# Patient Record
Sex: FEMALE | Race: OTHER | NOT HISPANIC OR LATINO
[De-identification: names, ages, dates, MRNs, and addresses within clinical notes are randomized per-mention and may not be internally consistent; named-entity substitution may affect disease eponyms.]

---

## 2021-06-24 ENCOUNTER — ASOB RESULT (OUTPATIENT)
Age: 21
End: 2021-06-24

## 2021-06-24 ENCOUNTER — APPOINTMENT (OUTPATIENT)
Dept: ANTEPARTUM | Facility: CLINIC | Age: 21
End: 2021-06-24
Payer: MEDICAID

## 2021-06-24 PROCEDURE — 76817 TRANSVAGINAL US OBSTETRIC: CPT

## 2021-06-24 PROCEDURE — 76805 OB US >/= 14 WKS SNGL FETUS: CPT

## 2021-06-24 PROCEDURE — 99072 ADDL SUPL MATRL&STAF TM PHE: CPT

## 2021-08-04 PROBLEM — Z00.00 ENCOUNTER FOR PREVENTIVE HEALTH EXAMINATION: Status: ACTIVE | Noted: 2021-08-04

## 2021-09-23 ENCOUNTER — APPOINTMENT (OUTPATIENT)
Dept: ANTEPARTUM | Facility: CLINIC | Age: 21
End: 2021-09-23
Payer: MEDICAID

## 2021-09-23 ENCOUNTER — APPOINTMENT (OUTPATIENT)
Dept: ANTEPARTUM | Facility: HOSPITAL | Age: 21
End: 2021-09-23

## 2021-09-23 ENCOUNTER — ASOB RESULT (OUTPATIENT)
Age: 21
End: 2021-09-23

## 2021-09-23 PROCEDURE — 76816 OB US FOLLOW-UP PER FETUS: CPT

## 2021-09-23 PROCEDURE — 76819 FETAL BIOPHYS PROFIL W/O NST: CPT

## 2021-09-23 PROCEDURE — 99213 OFFICE O/P EST LOW 20 MIN: CPT | Mod: 25

## 2021-10-09 ENCOUNTER — OUTPATIENT (OUTPATIENT)
Dept: INPATIENT UNIT | Facility: HOSPITAL | Age: 21
LOS: 1 days | Discharge: HOME | End: 2021-10-09
Payer: MEDICAID

## 2021-10-09 VITALS — DIASTOLIC BLOOD PRESSURE: 73 MMHG | SYSTOLIC BLOOD PRESSURE: 122 MMHG | HEART RATE: 77 BPM

## 2021-10-09 VITALS — SYSTOLIC BLOOD PRESSURE: 122 MMHG | HEART RATE: 83 BPM | DIASTOLIC BLOOD PRESSURE: 84 MMHG

## 2021-10-09 DIAGNOSIS — O26.899 OTHER SPECIFIED PREGNANCY RELATED CONDITIONS, UNSPECIFIED TRIMESTER: ICD-10-CM

## 2021-10-09 DIAGNOSIS — Z3A.00 WEEKS OF GESTATION OF PREGNANCY NOT SPECIFIED: ICD-10-CM

## 2021-10-09 PROCEDURE — 76818 FETAL BIOPHYS PROFILE W/NST: CPT | Mod: 26

## 2021-10-09 PROCEDURE — 99214 OFFICE O/P EST MOD 30 MIN: CPT

## 2021-10-09 RX ORDER — BENZOYL PEROXIDE MICRONIZED 5.8 %
1 TOWELETTE (EA) TOPICAL
Qty: 0 | Refills: 0 | DISCHARGE

## 2021-10-09 NOTE — OB PROVIDER TRIAGE NOTE - NSOBPROVIDERNOTE_OBGYN_ALL_OB_FT
19 yo , EGA@38.5 weeks, presented to D&T with c/o decreased fetal movements since this morning.  Patient denies contractions, denies vaginal bleeding, leakage of fluid, and reports fetal movement.  Denies fever, chills, headaches, changes in vision, chest pain, palpitations, shortness of breath, cough, nausea, vomiting, diarrhea, constipation, urinary symptoms, edema.    Prenatal care with Dr Christine.    Prenatal course is uncomplicated.  patient reports an episode of palpitations and SOB last week, reported to Dr Christine yesterday, was referred to cardiologist.    GBS status is negative.  No adverse reactions to anesthesia, no objections to blood transfusions if clinically indicated.  OB hx: primigravida  Med hx: anemia  Surg hx: denies  GYN hx: denies hx of abnormal Papsmear/cysts/fibroids/STDs  Meds: Prenatal vitamins  Allergies: NKDA  shellfish (Vomiting)    Social hx: Denies alcohol, tobacco, drug use    PHYSICAL EXAM  Vital Signs:  T(C): 37.1 (09 Oct 2021 17:22), Max: 37.1 (09 Oct 2021 17:22)  HR: 83 (09 Oct 2021 17:22)   BP: 122/84 (09 Oct 2021 17:22)   RR: 14 (09 Oct 2021 17:22)     Gen: NAD  Head: NC/AT  Cardio: S1S2+, RRR  Resp: CTABL, no wheezing  Abdomen: Soft, NT/ND, BS+  Extremities: No LE edema bilaterally    FHR: 135 HR baseline, moderate variability, accelerations present, no decelerations, Category 1.  [place]: Contractions occasional mild    A:  19 yo , EGA@38.6  weeks, decreased fetal movements, category 1 FHTs    Plan: will consult with service attending after BPP.    Princess Carbone CNM     10-09-21 @ 17:32 21 yo , EGA@38.5 weeks, presented to D&T with c/o decreased fetal movements since this morning.  Patient denies contractions, denies vaginal bleeding, leakage of fluid, and reports fetal movement.  Denies fever, chills, headaches, changes in vision, chest pain, palpitations, shortness of breath, cough, nausea, vomiting, diarrhea, constipation, urinary symptoms, edema.    Prenatal care with Dr Christine.    Prenatal course is uncomplicated.  patient reports an episode of palpitations and SOB last week, reported to Dr Christine yesterday, was referred to cardiologist.    GBS status is negative.  No adverse reactions to anesthesia, no objections to blood transfusions if clinically indicated.  OB hx: primigravida  Med hx: anemia  Surg hx: denies  GYN hx: denies hx of abnormal Papsmear/cysts/fibroids/STDs  Meds: Prenatal vitamins  Allergies: NKDA  shellfish (Vomiting)    Social hx: Denies alcohol, tobacco, drug use    PHYSICAL EXAM  Vital Signs:  T(C): 37.1 (09 Oct 2021 17:22), Max: 37.1 (09 Oct 2021 17:22)  HR: 83 (09 Oct 2021 17:22)   BP: 122/84 (09 Oct 2021 17:22)   RR: 14 (09 Oct 2021 17:22)     Gen: NAD  Head: NC/AT  Cardio: S1S2+, RRR  Resp: CTABL, no wheezing  Abdomen: Soft, NT/ND, BS+  Extremities: No LE edema bilaterally    FHR: 135 HR baseline, moderate variability, accelerations present, no decelerations, Category 1.  [place]: Contractions occasional mild    A:  21 yo , EGA@38.6  weeks, decreased fetal movements, category 1 FHTs    Plan: will consult with service attending after BPP.    Princess Carbone CNM     10-09-21 @ 17:32    addendum at 17:55: BPP /, posterior placenta, ANN MARIE 10.3, EFW 6lbs; case was reviewed with Dr Meek, was advised to discharge patient home with fetal movement count instructions; labor precautions given; follow up with PMD as scheduled.

## 2021-10-09 NOTE — OB PROVIDER TRIAGE NOTE - HISTORY OF PRESENT ILLNESS
Patient presented c/o decreased fetal movements since this morning, reports + movements as soon as placed on EFM.

## 2021-10-14 ENCOUNTER — OUTPATIENT (OUTPATIENT)
Dept: INPATIENT UNIT | Facility: HOSPITAL | Age: 21
LOS: 1 days | Discharge: HOME | End: 2021-10-14
Payer: MEDICAID

## 2021-10-14 VITALS — SYSTOLIC BLOOD PRESSURE: 123 MMHG | HEART RATE: 89 BPM | DIASTOLIC BLOOD PRESSURE: 76 MMHG

## 2021-10-14 VITALS — DIASTOLIC BLOOD PRESSURE: 76 MMHG | SYSTOLIC BLOOD PRESSURE: 121 MMHG | HEART RATE: 81 BPM

## 2021-10-14 DIAGNOSIS — O26.899 OTHER SPECIFIED PREGNANCY RELATED CONDITIONS, UNSPECIFIED TRIMESTER: ICD-10-CM

## 2021-10-14 DIAGNOSIS — Z3A.00 WEEKS OF GESTATION OF PREGNANCY NOT SPECIFIED: ICD-10-CM

## 2021-10-14 PROBLEM — O99.019 ANEMIA COMPLICATING PREGNANCY, UNSPECIFIED TRIMESTER: Chronic | Status: ACTIVE | Noted: 2021-10-09

## 2021-10-14 PROCEDURE — 76816 OB US FOLLOW-UP PER FETUS: CPT | Mod: 26

## 2021-10-14 PROCEDURE — 99214 OFFICE O/P EST MOD 30 MIN: CPT | Mod: 25

## 2021-10-14 PROCEDURE — 76818 FETAL BIOPHYS PROFILE W/NST: CPT | Mod: 26

## 2021-10-14 NOTE — OB PROVIDER TRIAGE NOTE - NSHPPHYSICALEXAM_GEN_ALL_CORE
Emergency treatment for patients with severe immediate allergic reactions to drugs, food or insect bites:      The clinical appearance of severe immediate type allergic reactions can range from wheals and hives all over the body (urticaria), to swellings in the face (eyes, lips) to sometimes swellings in the tongue or airways with problems to swallow or breathe. These reactions can end up in cardiovascular reactions with collapse and shock.    1. Stay calm, avoid running around, and alert other people to help you    2. Immediately take your emergency medication.     For adults (over 16 years old): 2 tablet of antihistamines (e.g. cetirizine 10 mg or fexofenadine 180 mg) and 100 mg prednisone.     For children (less than 16 years old): 1 tablet of antihistamine (e.g. cetirizine 10 mg or fexofenadine 180 mg) and 50 mg prednisone    Swallow however you can, with or without water. This treatment is usually sufficient for treatment of uncomplicated hives and swellings.       Emergency set in key chain box containing 2 tablets prednisone 50 mg and 2 tablets cetirizine 10 mg.    3. In case of acute swelling of tongue, trouble swallowing, blocking of the airways, breathing problems, and/or signs of imminent collapse of shock (sweating, weakness, dizziness, paleness), use the adrenalin auto-injector (Epipen   for adults and Epipen   sarah for children). Make an injection into the outer thighs, if necessary through clothing.    4. Seek immediate emergency medical treatment after use.        Juliano Garcia, [facility], [place], [facility]; 03-           LS clear bilaterally  abd soft gravid NT no pain 0/10  TAS: vertex  BPP 8/8  ANN MARIE: 8.3  SSE: negative pooling negative nitrazine negative fern negative  SVE: 0/50/-3  FHT: moderate variability + accels negative decels  toco: irregular not feeling  Vital Signs Last 24 Hrs  T(C): 37.1 (14 Oct 2021 03:11), Max: 37.1 (14 Oct 2021 03:11)  T(F): 98.8 (14 Oct 2021 03:11), Max: 98.8 (14 Oct 2021 03:11)  HR: 89 (14 Oct 2021 03:11) (89 - 89)  BP: 123/76 (14 Oct 2021 03:11) (123/76 - 123/76)  BP(mean): --  RR: 16 (14 Oct 2021 03:11) (16 - 16)  SpO2: --

## 2021-10-14 NOTE — OB PROVIDER TRIAGE NOTE - HISTORY OF PRESENT ILLNESS
21 yo  @ 39.3 wks c/o LOF at 1120pm clear and feeling discharge @ 130am, denies vb, reports +GFM. AP course complicated by low maternal weight gain, poor fetal growth- MFM scan without fetal growth issue, transferred care to Dr Issa at 20 wks. denies fever chills ha n/v new swelling vision changes cp sob or cough. last seen in office Friday not dilated.     GBS: negative  meds: iron  All: shellfish  PMH: denies  PSH: denies  gyn hx; denies  ob hx: denies

## 2021-10-14 NOTE — OB PROVIDER TRIAGE NOTE - ADDITIONAL INSTRUCTIONS
d/w Dr Mcbride discharge home no evidence of rupture @ 39.3 wks  maternal and fetal surveillance reassuring  rest activity as tolerated  increase water intake  labor precautions instructed  keep all OB appointments- Friday  return for vaginal bleeding leakage of fluid decreased fetal movement or any concerns  v/w discharge instructions given with verbal understanding by patient

## 2021-10-14 NOTE — OB PROVIDER TRIAGE NOTE - NSOBPROVIDERNOTE_OBGYN_ALL_OB_FT
21 yo  @ 39.3 wks c/o LOF at 1120pm clear and feeling discharge @ 130am, denies vb, reports +GFM. AP course complicated by low maternal weight gain, poor fetal growth- MFM scan without fetal growth issue, transferred care to Dr Issa at 20 wks. denies fever chills ha n/v new swelling vision changes cp sob or cough. last seen in office Friday not dilated.     GBS: negative  meds: iron  All: shellfish  PMH: denies  PSH: denies  gyn hx; denies    d/w Dr Mcbride discharge home no evidence of rupture @ 39.3 wks  maternal and fetal surveillance reassuring  rest activity as tolerated  increase water intake  labor precautions instructed  keep all OB appointments- Friday  return for vaginal bleeding leakage of fluid decreased fetal movement or any concerns  v/w discharge instructions given with verbal understanding by patient

## 2021-10-19 ENCOUNTER — INPATIENT (INPATIENT)
Facility: HOSPITAL | Age: 21
LOS: 3 days | Discharge: HOME | End: 2021-10-23
Attending: OBSTETRICS & GYNECOLOGY | Admitting: OBSTETRICS & GYNECOLOGY

## 2021-10-19 VITALS — TEMPERATURE: 99 F

## 2021-10-19 DIAGNOSIS — O36.5931 MATERNAL CARE FOR OTHER KNOWN OR SUSPECTED POOR FETAL GROWTH, THIRD TRIMESTER, FETUS 1: ICD-10-CM

## 2021-10-19 DIAGNOSIS — Z3A.00 WEEKS OF GESTATION OF PREGNANCY NOT SPECIFIED: ICD-10-CM

## 2021-10-19 DIAGNOSIS — O26.899 OTHER SPECIFIED PREGNANCY RELATED CONDITIONS, UNSPECIFIED TRIMESTER: ICD-10-CM

## 2021-10-19 LAB
BASOPHILS # BLD AUTO: 0.02 K/UL — SIGNIFICANT CHANGE UP (ref 0–0.2)
BASOPHILS NFR BLD AUTO: 0.3 % — SIGNIFICANT CHANGE UP (ref 0–2)
BLD GP AB SCN SERPL QL: NEGATIVE — SIGNIFICANT CHANGE UP
EOSINOPHIL # BLD AUTO: 0.06 K/UL — SIGNIFICANT CHANGE UP (ref 0–0.5)
EOSINOPHIL NFR BLD AUTO: 0.9 % — SIGNIFICANT CHANGE UP (ref 0–6)
HCT VFR BLD CALC: 36.7 % — SIGNIFICANT CHANGE UP (ref 34.5–45)
HGB BLD-MCNC: 12.7 G/DL — SIGNIFICANT CHANGE UP (ref 11.5–15.5)
IANC: 3.47 K/UL — SIGNIFICANT CHANGE UP (ref 1.5–8.5)
IMM GRANULOCYTES NFR BLD AUTO: 0.1 % — SIGNIFICANT CHANGE UP (ref 0–1.5)
LYMPHOCYTES # BLD AUTO: 2.73 K/UL — SIGNIFICANT CHANGE UP (ref 1–3.3)
LYMPHOCYTES # BLD AUTO: 40.7 % — SIGNIFICANT CHANGE UP (ref 13–44)
MCHC RBC-ENTMCNC: 34.5 PG — HIGH (ref 27–34)
MCHC RBC-ENTMCNC: 34.6 GM/DL — SIGNIFICANT CHANGE UP (ref 32–36)
MCV RBC AUTO: 99.7 FL — SIGNIFICANT CHANGE UP (ref 80–100)
MONOCYTES # BLD AUTO: 0.42 K/UL — SIGNIFICANT CHANGE UP (ref 0–0.9)
MONOCYTES NFR BLD AUTO: 6.3 % — SIGNIFICANT CHANGE UP (ref 2–14)
NEUTROPHILS # BLD AUTO: 3.47 K/UL — SIGNIFICANT CHANGE UP (ref 1.8–7.4)
NEUTROPHILS NFR BLD AUTO: 51.7 % — SIGNIFICANT CHANGE UP (ref 43–77)
NRBC # BLD: 0 /100 WBCS — SIGNIFICANT CHANGE UP
NRBC # FLD: 0 K/UL — SIGNIFICANT CHANGE UP
PLATELET # BLD AUTO: 189 K/UL — SIGNIFICANT CHANGE UP (ref 150–400)
RBC # BLD: 3.68 M/UL — LOW (ref 3.8–5.2)
RBC # FLD: 13.5 % — SIGNIFICANT CHANGE UP (ref 10.3–14.5)
RH IG SCN BLD-IMP: POSITIVE — SIGNIFICANT CHANGE UP
RH IG SCN BLD-IMP: POSITIVE — SIGNIFICANT CHANGE UP
WBC # BLD: 6.71 K/UL — SIGNIFICANT CHANGE UP (ref 3.8–10.5)
WBC # FLD AUTO: 6.71 K/UL — SIGNIFICANT CHANGE UP (ref 3.8–10.5)

## 2021-10-19 RX ORDER — SODIUM CHLORIDE 9 MG/ML
1000 INJECTION, SOLUTION INTRAVENOUS
Refills: 0 | Status: DISCONTINUED | OUTPATIENT
Start: 2021-10-19 | End: 2021-10-21

## 2021-10-19 RX ORDER — OXYTOCIN 10 UNIT/ML
333.33 VIAL (ML) INJECTION
Qty: 20 | Refills: 0 | Status: DISCONTINUED | OUTPATIENT
Start: 2021-10-19 | End: 2021-10-21

## 2021-10-19 RX ADMIN — SODIUM CHLORIDE 125 MILLILITER(S): 9 INJECTION, SOLUTION INTRAVENOUS at 22:27

## 2021-10-19 NOTE — OB PROVIDER H&P - NSHPREVIEWOFSYSTEMS_GEN_ALL_CORE
REVIEW OF SYSTEMS:    CONSTITUTIONAL: No weakness, fevers or chills    EYES/ENT: No visual changes; No vertigo or throat pain     NECK: No pain or stiffness    RESPIRATORY: No cough, wheezing, hemoptysis; No shortness of breath    CARDIOVASCULAR: No chest pain or palpitations    GASTROINTESTINAL: No abdominal or epigastric pain. No nausea, vomiting, or hematemesis; No diarrhea or constipation. No melena or hematochezia.    GENITOURINARY: No dysuria, frequency or hematuria    NEUROLOGICAL: No numbness or weakness    SKIN: No itching, burning, rashes, or lesions     All other review of systems is negative unless indicated above

## 2021-10-19 NOTE — OB PROVIDER H&P - NSHPPHYSICALEXAM_GEN_ALL_CORE
T(C): 37.2 (10-19-21 @ 19:01), Max: 37.2 (10-19-21 @ 18:58)    HR: 75 (10-19-21 @ 20:18) (66 - 75)    BP: 126/86 (10-19-21 @ 20:18) (105/69 - 126/86)    RR: 16 (10-19-21 @ 19:01) (16 - 16)           Heart: RRR    Lungs: CTA    Abdomen: Gravid, soft, NT         NST: Reactive with moderate variability, Category 1 tracing    [place]: Irregular contractions not felt by patient    VE: closed/70/-3, intact membranes  TAS done today in office: EFW: 6#5

## 2021-10-19 NOTE — OB RN PATIENT PROFILE - EDUCATION ON THE POTENTIAL RISKS AND IMPACT OF EARLY USE OF PACIFIERS ON THE ESTABLISHMENT OF BREASTFEEDING

## 2021-10-19 NOTE — OB PROVIDER H&P - PROBLEM SELECTOR PLAN 1
-Admit to labor and delivery for PO Cytotec and Cevical Balloon  -Pain Management prn  -Cont EFM/[place]  -Admission labs: CBC, RPR, T&S  -IV hydration  -Clear liquid diet  -Cytotec PO for IOL

## 2021-10-19 NOTE — OB PROVIDER H&P - HISTORY OF PRESENT ILLNESS
20y  at 40w1d presents sent to triage by OB for admission due to findings of no interval growth in the past couple of weeks  Reports +FM, no vaginal bleeding, no ROM or LOF  Prenatal care: [facility]; Denies any complications so far  GBS: Negative

## 2021-10-19 NOTE — OB PROVIDER H&P - ASSESSMENT
20y  Female  at 40w1d for induction of labor due to poor fetal growth  D/w Dr Espinal  -Admit to labor and delivery for PO Cytotec and Cevical Balloon  -Pain Management prn  -Cont EFM/[place]  -Admission labs: CBC, RPR, T&S  -IV hydration  -Clear liquid diet  -Cytotec PO for IOL

## 2021-10-20 ENCOUNTER — TRANSCRIPTION ENCOUNTER (OUTPATIENT)
Age: 21
End: 2021-10-20

## 2021-10-20 LAB
ALBUMIN SERPL ELPH-MCNC: 3.1 G/DL — LOW (ref 3.3–5)
ALP SERPL-CCNC: 127 U/L — HIGH (ref 40–120)
ALT FLD-CCNC: 9 U/L — SIGNIFICANT CHANGE UP (ref 4–33)
ANION GAP SERPL CALC-SCNC: 12 MMOL/L — SIGNIFICANT CHANGE UP (ref 7–14)
APTT BLD: 28.3 SEC — SIGNIFICANT CHANGE UP (ref 27–36.3)
AST SERPL-CCNC: 20 U/L — SIGNIFICANT CHANGE UP (ref 4–32)
BASOPHILS # BLD AUTO: 0.02 K/UL — SIGNIFICANT CHANGE UP (ref 0–0.2)
BASOPHILS NFR BLD AUTO: 0.2 % — SIGNIFICANT CHANGE UP (ref 0–2)
BILIRUB SERPL-MCNC: 0.4 MG/DL — SIGNIFICANT CHANGE UP (ref 0.2–1.2)
BUN SERPL-MCNC: 8 MG/DL — SIGNIFICANT CHANGE UP (ref 7–23)
CALCIUM SERPL-MCNC: 8.3 MG/DL — LOW (ref 8.4–10.5)
CHLORIDE SERPL-SCNC: 104 MMOL/L — SIGNIFICANT CHANGE UP (ref 98–107)
CK SERPL-CCNC: 41 U/L — SIGNIFICANT CHANGE UP (ref 25–170)
CO2 SERPL-SCNC: 19 MMOL/L — LOW (ref 22–31)
COVID-19 SPIKE DOMAIN AB INTERP: NEGATIVE — SIGNIFICANT CHANGE UP
COVID-19 SPIKE DOMAIN ANTIBODY RESULT: 0.4 U/ML — SIGNIFICANT CHANGE UP
CREAT SERPL-MCNC: 0.72 MG/DL — SIGNIFICANT CHANGE UP (ref 0.5–1.3)
EOSINOPHIL # BLD AUTO: 0.02 K/UL — SIGNIFICANT CHANGE UP (ref 0–0.5)
EOSINOPHIL NFR BLD AUTO: 0.2 % — SIGNIFICANT CHANGE UP (ref 0–6)
FIBRINOGEN PPP-MCNC: 471 MG/DL — SIGNIFICANT CHANGE UP (ref 290–520)
GLUCOSE SERPL-MCNC: 69 MG/DL — LOW (ref 70–99)
HBV SURFACE AG SER-ACNC: SIGNIFICANT CHANGE UP
HCT VFR BLD CALC: 36.1 % — SIGNIFICANT CHANGE UP (ref 34.5–45)
HGB BLD-MCNC: 12.5 G/DL — SIGNIFICANT CHANGE UP (ref 11.5–15.5)
IANC: 8.3 K/UL — SIGNIFICANT CHANGE UP (ref 1.5–8.5)
IMM GRANULOCYTES NFR BLD AUTO: 0.4 % — SIGNIFICANT CHANGE UP (ref 0–1.5)
INR BLD: 1.01 RATIO — SIGNIFICANT CHANGE UP (ref 0.88–1.16)
LACTATE SERPL-SCNC: 0.9 MMOL/L — SIGNIFICANT CHANGE UP (ref 0.5–2)
LDH SERPL L TO P-CCNC: 176 U/L — SIGNIFICANT CHANGE UP (ref 135–225)
LYMPHOCYTES # BLD AUTO: 1.27 K/UL — SIGNIFICANT CHANGE UP (ref 1–3.3)
LYMPHOCYTES # BLD AUTO: 12.3 % — LOW (ref 13–44)
MCHC RBC-ENTMCNC: 34.6 GM/DL — SIGNIFICANT CHANGE UP (ref 32–36)
MCHC RBC-ENTMCNC: 34.7 PG — HIGH (ref 27–34)
MCV RBC AUTO: 100.3 FL — HIGH (ref 80–100)
MONOCYTES # BLD AUTO: 0.67 K/UL — SIGNIFICANT CHANGE UP (ref 0–0.9)
MONOCYTES NFR BLD AUTO: 6.5 % — SIGNIFICANT CHANGE UP (ref 2–14)
NEUTROPHILS # BLD AUTO: 8.3 K/UL — HIGH (ref 1.8–7.4)
NEUTROPHILS NFR BLD AUTO: 80.4 % — HIGH (ref 43–77)
NRBC # BLD: 0 /100 WBCS — SIGNIFICANT CHANGE UP
NRBC # FLD: 0 K/UL — SIGNIFICANT CHANGE UP
PLATELET # BLD AUTO: 135 K/UL — LOW (ref 150–400)
POTASSIUM SERPL-MCNC: 3.8 MMOL/L — SIGNIFICANT CHANGE UP (ref 3.5–5.3)
POTASSIUM SERPL-SCNC: 3.8 MMOL/L — SIGNIFICANT CHANGE UP (ref 3.5–5.3)
PROT SERPL-MCNC: 5.6 G/DL — LOW (ref 6–8.3)
PROTHROM AB SERPL-ACNC: 11.5 SEC — SIGNIFICANT CHANGE UP (ref 10.6–13.6)
RBC # BLD: 3.6 M/UL — LOW (ref 3.8–5.2)
RBC # FLD: 13.5 % — SIGNIFICANT CHANGE UP (ref 10.3–14.5)
SARS-COV-2 IGG+IGM SERPL QL IA: 0.4 U/ML — SIGNIFICANT CHANGE UP
SARS-COV-2 IGG+IGM SERPL QL IA: NEGATIVE — SIGNIFICANT CHANGE UP
SARS-COV-2 RNA SPEC QL NAA+PROBE: SIGNIFICANT CHANGE UP
SODIUM SERPL-SCNC: 135 MMOL/L — SIGNIFICANT CHANGE UP (ref 135–145)
T PALLIDUM AB TITR SER: NEGATIVE — SIGNIFICANT CHANGE UP
URATE SERPL-MCNC: 5.1 MG/DL — SIGNIFICANT CHANGE UP (ref 2.5–7)
WBC # BLD: 10.32 K/UL — SIGNIFICANT CHANGE UP (ref 3.8–10.5)
WBC # FLD AUTO: 10.32 K/UL — SIGNIFICANT CHANGE UP (ref 3.8–10.5)

## 2021-10-20 RX ORDER — DIBUCAINE 1 %
1 OINTMENT (GRAM) RECTAL EVERY 6 HOURS
Refills: 0 | Status: DISCONTINUED | OUTPATIENT
Start: 2021-10-20 | End: 2021-10-23

## 2021-10-20 RX ORDER — ACETAMINOPHEN 500 MG
1000 TABLET ORAL ONCE
Refills: 0 | Status: COMPLETED | OUTPATIENT
Start: 2021-10-20 | End: 2021-10-20

## 2021-10-20 RX ORDER — PRAMOXINE HYDROCHLORIDE 150 MG/15G
1 AEROSOL, FOAM RECTAL EVERY 4 HOURS
Refills: 0 | Status: DISCONTINUED | OUTPATIENT
Start: 2021-10-20 | End: 2021-10-23

## 2021-10-20 RX ORDER — OXYTOCIN 10 UNIT/ML
2 VIAL (ML) INJECTION
Qty: 30 | Refills: 0 | Status: DISCONTINUED | OUTPATIENT
Start: 2021-10-20 | End: 2021-10-21

## 2021-10-20 RX ORDER — TETANUS TOXOID, REDUCED DIPHTHERIA TOXOID AND ACELLULAR PERTUSSIS VACCINE, ADSORBED 5; 2.5; 8; 8; 2.5 [IU]/.5ML; [IU]/.5ML; UG/.5ML; UG/.5ML; UG/.5ML
0.5 SUSPENSION INTRAMUSCULAR ONCE
Refills: 0 | Status: DISCONTINUED | OUTPATIENT
Start: 2021-10-20 | End: 2021-10-23

## 2021-10-20 RX ORDER — SODIUM CHLORIDE 9 MG/ML
3 INJECTION INTRAMUSCULAR; INTRAVENOUS; SUBCUTANEOUS EVERY 8 HOURS
Refills: 0 | Status: DISCONTINUED | OUTPATIENT
Start: 2021-10-20 | End: 2021-10-23

## 2021-10-20 RX ORDER — DIPHENHYDRAMINE HCL 50 MG
25 CAPSULE ORAL EVERY 6 HOURS
Refills: 0 | Status: DISCONTINUED | OUTPATIENT
Start: 2021-10-20 | End: 2021-10-23

## 2021-10-20 RX ORDER — METOCLOPRAMIDE HCL 10 MG
10 TABLET ORAL ONCE
Refills: 0 | Status: DISCONTINUED | OUTPATIENT
Start: 2021-10-20 | End: 2021-10-23

## 2021-10-20 RX ORDER — SIMETHICONE 80 MG/1
80 TABLET, CHEWABLE ORAL EVERY 4 HOURS
Refills: 0 | Status: DISCONTINUED | OUTPATIENT
Start: 2021-10-20 | End: 2021-10-23

## 2021-10-20 RX ORDER — HYDROCORTISONE 1 %
1 OINTMENT (GRAM) TOPICAL EVERY 6 HOURS
Refills: 0 | Status: DISCONTINUED | OUTPATIENT
Start: 2021-10-20 | End: 2021-10-23

## 2021-10-20 RX ORDER — ONDANSETRON 8 MG/1
4 TABLET, FILM COATED ORAL ONCE
Refills: 0 | Status: COMPLETED | OUTPATIENT
Start: 2021-10-20 | End: 2021-10-20

## 2021-10-20 RX ORDER — SODIUM CHLORIDE 9 MG/ML
1000 INJECTION INTRAMUSCULAR; INTRAVENOUS; SUBCUTANEOUS
Refills: 0 | Status: DISCONTINUED | OUTPATIENT
Start: 2021-10-20 | End: 2021-10-21

## 2021-10-20 RX ORDER — BENZOCAINE 10 %
1 GEL (GRAM) MUCOUS MEMBRANE EVERY 6 HOURS
Refills: 0 | Status: DISCONTINUED | OUTPATIENT
Start: 2021-10-20 | End: 2021-10-23

## 2021-10-20 RX ORDER — OXYTOCIN 10 UNIT/ML
20 VIAL (ML) INJECTION ONCE
Refills: 0 | Status: DISCONTINUED | OUTPATIENT
Start: 2021-10-20 | End: 2021-10-20

## 2021-10-20 RX ORDER — IBUPROFEN 200 MG
600 TABLET ORAL EVERY 6 HOURS
Refills: 0 | Status: DISCONTINUED | OUTPATIENT
Start: 2021-10-20 | End: 2021-10-23

## 2021-10-20 RX ORDER — KETOROLAC TROMETHAMINE 30 MG/ML
30 SYRINGE (ML) INJECTION ONCE
Refills: 0 | Status: DISCONTINUED | OUTPATIENT
Start: 2021-10-20 | End: 2021-10-21

## 2021-10-20 RX ORDER — ACETAMINOPHEN 500 MG
975 TABLET ORAL
Refills: 0 | Status: DISCONTINUED | OUTPATIENT
Start: 2021-10-20 | End: 2021-10-23

## 2021-10-20 RX ORDER — AER TRAVELER 0.5 G/1
1 SOLUTION RECTAL; TOPICAL EVERY 4 HOURS
Refills: 0 | Status: DISCONTINUED | OUTPATIENT
Start: 2021-10-20 | End: 2021-10-23

## 2021-10-20 RX ORDER — METOCLOPRAMIDE HCL 10 MG
10 TABLET ORAL ONCE
Refills: 0 | Status: DISCONTINUED | OUTPATIENT
Start: 2021-10-20 | End: 2021-10-20

## 2021-10-20 RX ORDER — OXYTOCIN 10 UNIT/ML
333.33 VIAL (ML) INJECTION
Qty: 20 | Refills: 0 | Status: DISCONTINUED | OUTPATIENT
Start: 2021-10-20 | End: 2021-10-21

## 2021-10-20 RX ORDER — OXYCODONE HYDROCHLORIDE 5 MG/1
5 TABLET ORAL ONCE
Refills: 0 | Status: DISCONTINUED | OUTPATIENT
Start: 2021-10-20 | End: 2021-10-23

## 2021-10-20 RX ORDER — OXYCODONE HYDROCHLORIDE 5 MG/1
5 TABLET ORAL
Refills: 0 | Status: DISCONTINUED | OUTPATIENT
Start: 2021-10-20 | End: 2021-10-23

## 2021-10-20 RX ORDER — LANOLIN
1 OINTMENT (GRAM) TOPICAL EVERY 6 HOURS
Refills: 0 | Status: DISCONTINUED | OUTPATIENT
Start: 2021-10-20 | End: 2021-10-23

## 2021-10-20 RX ORDER — MAGNESIUM HYDROXIDE 400 MG/1
30 TABLET, CHEWABLE ORAL
Refills: 0 | Status: DISCONTINUED | OUTPATIENT
Start: 2021-10-20 | End: 2021-10-23

## 2021-10-20 RX ORDER — OXYTOCIN 10 UNIT/ML
20 VIAL (ML) INJECTION ONCE
Refills: 0 | Status: COMPLETED | OUTPATIENT
Start: 2021-10-20 | End: 2021-10-20

## 2021-10-20 RX ORDER — SODIUM CHLORIDE 9 MG/ML
300 INJECTION INTRAMUSCULAR; INTRAVENOUS; SUBCUTANEOUS ONCE
Refills: 0 | Status: COMPLETED | OUTPATIENT
Start: 2021-10-20 | End: 2021-10-20

## 2021-10-20 RX ADMIN — Medication 0.2 MILLIGRAM(S): at 20:14

## 2021-10-20 RX ADMIN — Medication 0.25 MILLIGRAM(S): at 12:10

## 2021-10-20 RX ADMIN — Medication 1000 MILLIGRAM(S): at 22:03

## 2021-10-20 RX ADMIN — Medication 1000 MILLIUNIT(S)/MIN: at 22:31

## 2021-10-20 RX ADMIN — Medication 2 MILLIUNIT(S)/MIN: at 16:46

## 2021-10-20 RX ADMIN — Medication 400 MILLIGRAM(S): at 21:33

## 2021-10-20 RX ADMIN — Medication 20 UNIT(S): at 20:14

## 2021-10-20 RX ADMIN — SODIUM CHLORIDE 1200 MILLILITER(S): 9 INJECTION INTRAMUSCULAR; INTRAVENOUS; SUBCUTANEOUS at 12:15

## 2021-10-20 RX ADMIN — ONDANSETRON 4 MILLIGRAM(S): 8 TABLET, FILM COATED ORAL at 09:11

## 2021-10-20 RX ADMIN — SODIUM CHLORIDE 125 MILLILITER(S): 9 INJECTION INTRAMUSCULAR; INTRAVENOUS; SUBCUTANEOUS at 16:11

## 2021-10-20 RX ADMIN — ONDANSETRON 4 MILLIGRAM(S): 8 TABLET, FILM COATED ORAL at 21:04

## 2021-10-20 NOTE — OB RN DELIVERY SUMMARY - NSSELHIDDEN_OBGYN_ALL_OB_FT
[NS_DeliveryAttending1_OBGYN_ALL_OB_FT:[id number]],[NS_DeliveryRN_OBGYN_ALL_OB_FT:[id number]] [NS_DeliveryAttending1_OBGYN_ALL_OB_FT:[id number]],[NS_DeliveryRN_OBGYN_ALL_OB_FT:[id number]],[NS_DeliveryAssist1_OBGYN_ALL_OB_FT:[id number]=]

## 2021-10-20 NOTE — OB RN DELIVERY SUMMARY - NS_SEPSISRSKCALC_OBGYN_ALL_OB_FT
EOS calculated successfully. EOS Risk Factor: 0.5/1000 live births ([facility] national incidence); GA=40w2d; Temp=99; ROM=8.25; GBS='Negative'; Antibiotics='No antibiotics or any antibiotics < 2 hrs prior to birth'

## 2021-10-20 NOTE — DISCHARGE NOTE OB - PLAN OF CARE
After discharge, please stay on pelvic rest for 6 weeks, meaning no sexual intercourse, no tampons and no douching.   No lifting objects heavier than baby for two weeks.  Expect to have vaginal bleeding/spotting for up to six weeks.  The bleeding should get lighter and more white/light brown with time.  For bleeding soaking more than a pad an hour or passing clots greater than the size of your fist, come in to the emergency department.    Follow up in office in 6 weeks.

## 2021-10-20 NOTE — OB PROVIDER LABOR PROGRESS NOTE - NS_OBIHIFHRDETAILS_OBGYN_ALL_OB_FT
baseline 120,moderate variability, +accels, -decels
repetitive variable decelerations noted  SROM was noted for clear amniotic fluid

## 2021-10-20 NOTE — DISCHARGE NOTE OB - HOSPITAL COURSE
Patient had nonsurgical vaginal delivery complicated by postpartum hemorrhage 2/2 uterine atony that improved status post methergine, pitocin, and bimanual massage. Please see delivery note for details.      During postpartum course patient's vitals were stable, vaginal bleeding appropriate, and pain well controlled.      On day of discharge patient is ambulating, tolerating oral intake, voiding spontaneously and her pain is controlled with oral medications. Discharge instructions and precautions were given.  Will return to office in 6 weeks for postpartum visit and may contact the office with any concerns or issues prior to that time.

## 2021-10-20 NOTE — PROVIDER CONTACT NOTE (OTHER) - SITUATION
s/p  of viable female @ presents with elevated BP and HA postpartum s/p  of viable female @. patient presents with elevated BP and HA postpartum

## 2021-10-20 NOTE — OB PROVIDER LABOR PROGRESS NOTE - ASSESSMENT
Dr Arrington called; IUPC was placed for possible amnioinfusion; as per PMD, Terbutaline was administered patient is to transferred to OR for monitoring and possible  section.

## 2021-10-20 NOTE — DISCHARGE NOTE OB - PATIENT PORTAL LINK FT
You can access the FollowMyHealth Patient Portal offered by [facility] by registering at the following website: [email or web address]. By joining [facility]’s FollowMyHealth portal, you will also be able to view your health information using other applications (apps) compatible with our system.

## 2021-10-20 NOTE — DISCHARGE NOTE OB - CARE PLAN
Principal Discharge DX:	 (normal spontaneous vaginal delivery)  Assessment and plan of treatment:	After discharge, please stay on pelvic rest for 6 weeks, meaning no sexual intercourse, no tampons and no douching.   No lifting objects heavier than baby for two weeks.  Expect to have vaginal bleeding/spotting for up to six weeks.  The bleeding should get lighter and more white/light brown with time.  For bleeding soaking more than a pad an hour or passing clots greater than the size of your fist, come in to the emergency department.    Follow up in office in 6 weeks.   1

## 2021-10-20 NOTE — PROVIDER CONTACT NOTE (OTHER) - DATE AND TIME:
20-Oct-2021 21:30 Finasteride Male Counseling: Finasteride Counseling:  I discussed with the patient the risks of use of finasteride including but not limited to decreased libido, decreased ejaculate volume, gynecomastia, and depression. Women should not handle medication.  All of the patient's questions and concerns were addressed. Finasteride Counseling:  I discussed with the patient the risks of use of finasteride including but not limited to decreased libido, decreased ejaculate volume, gynecomastia, and depression. Women should not handle medication.  All of the patient's questions and concerns were addressed.

## 2021-10-20 NOTE — DISCHARGE NOTE OB - CARE PROVIDER_API CALL
Alessandra Mcbride (DO)  Obstetrics and Gynecology  [address] [place] [address]  Phone: [phone]  Fax: [phone]  Follow Up Time:

## 2021-10-20 NOTE — OB PROVIDER LABOR PROGRESS NOTE - ASSESSMENT
PLAN:  Continue Pitocin as FHT tolerates.     D/w Dr. Murphy Narvaez PGY-4 PLAN:  Continue Pitocin as FHT tolerates.   FHT currently reassuring with moderate variability.     D/w Dr. Murphy Narvaez PGY-4

## 2021-10-20 NOTE — OB PROVIDER DELIVERY SUMMARY - NSSELHIDDEN_OBGYN_ALL_OB_FT
[NS_DeliveryAttending1_OBGYN_ALL_OB_FT:[id number]] [NS_DeliveryAttending1_OBGYN_ALL_OB_FT:[id number]],[NS_DeliveryAssist1_OBGYN_ALL_OB_FT:[id number]=]

## 2021-10-20 NOTE — OB PROVIDER LABOR PROGRESS NOTE - ASSESSMENT
21 y/o  @40w2d IOL for poor interval growth.  - IOL: on po cytotec, CB placed 60 cc NS in uterine, 60 in vagina  - comfortable with epidural  - cat I tracing    Netta Beltran, PGY-2

## 2021-10-20 NOTE — OB PROVIDER DELIVERY SUMMARY - NSPROVIDERDELIVERYNOTE_OBGYN_ALL_OB_FT
Spontaneous vaginal delivery of liveborn infant in CHRIS position. Head delivered easily through loose nuchal cord x1. Shoulders and body delivered easily after. Infant was suctioned. No mec was noted. Infant was passed to mother for delayed cord clamping and skin to skin. cord was clamped and cut in delayed fashion per peds present at delivery. Placenta delivered intact with a 3 vessel cord noted. Uterine atony was noted which improved status post methergine, fundal massage and post partum Pitocin. Uterine fundus was firm after uterotonics and massage. Vaginal exam was performed and noted intact cervix, vaginal walls and sulci. Small 1st degree laceration was noted and hemostatic.  Excellent hemostasis was noted. Count was correctx2. Pt. was stable after delivery.     Female, Apgars 8/9, weight 3090g. Spontaneous vaginal delivery of liveborn infant in CHRIS position. Head delivered easily through loose nuchal cord x1. Shoulders and body delivered easily after. Infant was suctioned. No mec was noted. Infant was passed to mother for delayed cord clamping and skin to skin. cord was clamped and cut in delayed fashion per peds present at delivery. Placenta delivered intact with a 3 vessel cord noted. Uterine atony was noted which improved status post methergine, fundal massage and post partum Pitocin 40U. Uterine fundus was firm after uterotonics and massage. Vaginal exam was performed and noted intact cervix, vaginal walls and sulci. Small 1st degree laceration was noted and hemostatic.  Excellent hemostasis was noted. Count was correctx2. Pt was stable after delivery.     Female, Apgars 8/9, Weight 3090g.    - Dr Beetham - Amyeo Afroz Jereen, PGY-1

## 2021-10-20 NOTE — DISCHARGE NOTE OB - MEDICATION SUMMARY - MEDICATIONS TO TAKE
I will START or STAY ON the medications listed below when I get home from the hospital:    acetaminophen 325 mg oral tablet  -- 3 tab(s) by mouth every 6 hours, As Needed  -- Indication: For  (normal spontaneous vaginal delivery)    ibuprofen 600 mg oral tablet  -- 1 tab(s) by mouth every 6 hours, As Needed  -- Indication: For  (normal spontaneous vaginal delivery)    Prenatal Multivitamins with Folic Acid 1 mg oral tablet  -- 1 tab(s) by mouth once a day  -- Indication: For  (normal spontaneous vaginal delivery)    Iron 100 Plus oral tablet  -- 1 tab(s) by mouth once a day  -- Indication: For  (normal spontaneous vaginal delivery)

## 2021-10-20 NOTE — OB PROVIDER LABOR PROGRESS NOTE - NS_SUBJECTIVE/OBJECTIVE_OBGYN_ALL_OB_FT
Patient seen and examined for CB placement    Vital Signs Last 24 Hrs  T(C): 36.6 (20 Oct 2021 06:05), Max: 37.2 (19 Oct 2021 18:58)  T(F): 97.88 (20 Oct 2021 06:05), Max: 99 (19 Oct 2021 19:01)  HR: 63 (20 Oct 2021 06:52) (63 - 97)  BP: 99/60 (20 Oct 2021 06:50) (93/59 - 132/89)  BP(mean): --  RR: 18 (19 Oct 2021 20:53) (16 - 18)  SpO2: 98% (20 Oct 2021 06:52) (98% - 100%)
Patient was evaluated at bedside at 11:50 am due to variable decelerations  Cervical balloon is place;
R4 Chart Note:    Patient examined by the attending.

## 2021-10-21 LAB
ALBUMIN SERPL ELPH-MCNC: 2.8 G/DL — LOW (ref 3.3–5)
ALP SERPL-CCNC: 70 U/L — SIGNIFICANT CHANGE UP (ref 40–120)
ALT FLD-CCNC: 7 U/L — SIGNIFICANT CHANGE UP (ref 4–33)
ANION GAP SERPL CALC-SCNC: 10 MMOL/L — SIGNIFICANT CHANGE UP (ref 7–14)
APTT BLD: 31.3 SEC — SIGNIFICANT CHANGE UP (ref 27–36.3)
AST SERPL-CCNC: 26 U/L — SIGNIFICANT CHANGE UP (ref 4–32)
BASOPHILS # BLD AUTO: 0.02 K/UL — SIGNIFICANT CHANGE UP (ref 0–0.2)
BASOPHILS NFR BLD AUTO: 0.1 % — SIGNIFICANT CHANGE UP (ref 0–2)
BILIRUB SERPL-MCNC: 0.4 MG/DL — SIGNIFICANT CHANGE UP (ref 0.2–1.2)
BUN SERPL-MCNC: 7 MG/DL — SIGNIFICANT CHANGE UP (ref 7–23)
CALCIUM SERPL-MCNC: 8 MG/DL — LOW (ref 8.4–10.5)
CHLORIDE SERPL-SCNC: 108 MMOL/L — HIGH (ref 98–107)
CK SERPL-CCNC: 255 U/L — HIGH (ref 25–170)
CO2 SERPL-SCNC: 20 MMOL/L — LOW (ref 22–31)
CREAT SERPL-MCNC: 0.76 MG/DL — SIGNIFICANT CHANGE UP (ref 0.5–1.3)
EOSINOPHIL # BLD AUTO: 0.03 K/UL — SIGNIFICANT CHANGE UP (ref 0–0.5)
EOSINOPHIL NFR BLD AUTO: 0.2 % — SIGNIFICANT CHANGE UP (ref 0–6)
FIBRINOGEN PPP-MCNC: 487 MG/DL — SIGNIFICANT CHANGE UP (ref 290–520)
GLUCOSE SERPL-MCNC: 75 MG/DL — SIGNIFICANT CHANGE UP (ref 70–99)
HCT VFR BLD CALC: 33 % — LOW (ref 34.5–45)
HGB BLD-MCNC: 11.6 G/DL — SIGNIFICANT CHANGE UP (ref 11.5–15.5)
IANC: 14.43 K/UL — HIGH (ref 1.5–8.5)
IMM GRANULOCYTES NFR BLD AUTO: 0.6 % — SIGNIFICANT CHANGE UP (ref 0–1.5)
INR BLD: 0.9 RATIO — SIGNIFICANT CHANGE UP (ref 0.88–1.16)
LACTATE SERPL-SCNC: 1.8 MMOL/L — SIGNIFICANT CHANGE UP (ref 0.5–2)
LDH SERPL L TO P-CCNC: 217 U/L — SIGNIFICANT CHANGE UP (ref 135–225)
LYMPHOCYTES # BLD AUTO: 12.4 % — LOW (ref 13–44)
LYMPHOCYTES # BLD AUTO: 2.19 K/UL — SIGNIFICANT CHANGE UP (ref 1–3.3)
MAGNESIUM SERPL-MCNC: 6.7 MG/DL — HIGH (ref 1.6–2.6)
MAGNESIUM SERPL-MCNC: 6.7 MG/DL — HIGH (ref 1.6–2.6)
MCHC RBC-ENTMCNC: 35.2 GM/DL — SIGNIFICANT CHANGE UP (ref 32–36)
MCHC RBC-ENTMCNC: 35.3 PG — HIGH (ref 27–34)
MCV RBC AUTO: 100.3 FL — HIGH (ref 80–100)
MONOCYTES # BLD AUTO: 0.84 K/UL — SIGNIFICANT CHANGE UP (ref 0–0.9)
MONOCYTES NFR BLD AUTO: 4.8 % — SIGNIFICANT CHANGE UP (ref 2–14)
NEUTROPHILS # BLD AUTO: 14.43 K/UL — HIGH (ref 1.8–7.4)
NEUTROPHILS NFR BLD AUTO: 81.9 % — HIGH (ref 43–77)
NRBC # BLD: 0 /100 WBCS — SIGNIFICANT CHANGE UP
NRBC # FLD: 0 K/UL — SIGNIFICANT CHANGE UP
PLATELET # BLD AUTO: 142 K/UL — LOW (ref 150–400)
POTASSIUM SERPL-MCNC: 4.1 MMOL/L — SIGNIFICANT CHANGE UP (ref 3.5–5.3)
POTASSIUM SERPL-SCNC: 4.1 MMOL/L — SIGNIFICANT CHANGE UP (ref 3.5–5.3)
PROT SERPL-MCNC: 4.9 G/DL — LOW (ref 6–8.3)
PROTHROM AB SERPL-ACNC: 10.4 SEC — LOW (ref 10.6–13.6)
RBC # BLD: 3.29 M/UL — LOW (ref 3.8–5.2)
RBC # FLD: 13.7 % — SIGNIFICANT CHANGE UP (ref 10.3–14.5)
SODIUM SERPL-SCNC: 138 MMOL/L — SIGNIFICANT CHANGE UP (ref 135–145)
URATE SERPL-MCNC: 5.9 MG/DL — SIGNIFICANT CHANGE UP (ref 2.5–7)
WBC # BLD: 17.62 K/UL — HIGH (ref 3.8–10.5)
WBC # FLD AUTO: 17.62 K/UL — HIGH (ref 3.8–10.5)

## 2021-10-21 RX ORDER — SODIUM CHLORIDE 9 MG/ML
1000 INJECTION, SOLUTION INTRAVENOUS
Refills: 0 | Status: DISCONTINUED | OUTPATIENT
Start: 2021-10-21 | End: 2021-10-23

## 2021-10-21 RX ORDER — MAGNESIUM SULFATE 500 MG/ML
2 VIAL (ML) INJECTION
Qty: 40 | Refills: 0 | Status: DISCONTINUED | OUTPATIENT
Start: 2021-10-21 | End: 2021-10-21

## 2021-10-21 RX ORDER — MAGNESIUM SULFATE 500 MG/ML
4 VIAL (ML) INJECTION ONCE
Refills: 0 | Status: DISCONTINUED | OUTPATIENT
Start: 2021-10-21 | End: 2021-10-21

## 2021-10-21 RX ORDER — MAGNESIUM SULFATE 500 MG/ML
2 VIAL (ML) INJECTION
Qty: 40 | Refills: 0 | Status: DISCONTINUED | OUTPATIENT
Start: 2021-10-21 | End: 2021-10-23

## 2021-10-21 RX ORDER — IBUPROFEN 200 MG
1 TABLET ORAL
Qty: 0 | Refills: 0 | DISCHARGE
Start: 2021-10-21

## 2021-10-21 RX ORDER — ACETAMINOPHEN 500 MG
3 TABLET ORAL
Qty: 0 | Refills: 0 | DISCHARGE
Start: 2021-10-21

## 2021-10-21 RX ORDER — MAGNESIUM SULFATE 500 MG/ML
4 VIAL (ML) INJECTION ONCE
Refills: 0 | Status: COMPLETED | OUTPATIENT
Start: 2021-10-21 | End: 2021-10-21

## 2021-10-21 RX ADMIN — SODIUM CHLORIDE 50 MILLILITER(S): 9 INJECTION, SOLUTION INTRAVENOUS at 07:23

## 2021-10-21 RX ADMIN — SIMETHICONE 80 MILLIGRAM(S): 80 TABLET, CHEWABLE ORAL at 00:45

## 2021-10-21 RX ADMIN — Medication 30 MILLIGRAM(S): at 01:29

## 2021-10-21 RX ADMIN — Medication 300 GRAM(S): at 05:41

## 2021-10-21 RX ADMIN — Medication 50 GM/HR: at 06:04

## 2021-10-21 RX ADMIN — Medication 975 MILLIGRAM(S): at 23:00

## 2021-10-21 RX ADMIN — Medication 975 MILLIGRAM(S): at 14:06

## 2021-10-21 RX ADMIN — Medication 975 MILLIGRAM(S): at 04:54

## 2021-10-21 RX ADMIN — SODIUM CHLORIDE 3 MILLILITER(S): 9 INJECTION INTRAMUSCULAR; INTRAVENOUS; SUBCUTANEOUS at 06:12

## 2021-10-21 RX ADMIN — Medication 975 MILLIGRAM(S): at 05:15

## 2021-10-21 RX ADMIN — SIMETHICONE 80 MILLIGRAM(S): 80 TABLET, CHEWABLE ORAL at 04:54

## 2021-10-21 RX ADMIN — Medication 975 MILLIGRAM(S): at 22:45

## 2021-10-21 RX ADMIN — Medication 50 GM/HR: at 07:23

## 2021-10-21 RX ADMIN — Medication 50 GM/HR: at 07:54

## 2021-10-21 RX ADMIN — Medication 30 MILLIGRAM(S): at 01:59

## 2021-10-21 RX ADMIN — Medication 50 GM/HR: at 19:29

## 2021-10-21 NOTE — LACTATION INITIAL EVALUATION - INTERVENTION OUTCOME
Instructed in hand expression with good return demonstration. + colostrum noted./verbalizes understanding/demonstrates understanding of teaching/good return demonstration/needs met/Lactation team to follow up

## 2021-10-21 NOTE — LACTATION INITIAL EVALUATION - LACTATION INTERVENTIONS
initiate/review safe skin-to-skin/initiate/review hand expression/initiate/review pumping guidelines and safe milk handling/initiate/review breast massage/compression

## 2021-10-21 NOTE — LACTATION INITIAL EVALUATION - BREAST ASSESSMENT (RIGHT)
Date & Time: 9/23/2019, 11:07 AM  Patient: Marv Moser  Encounter Provider(s):    Roslyn Cee MD       To Whom It May Concern:    Jaydon Harris was seen and treated in our department on 9/23/2019. He is excused from work for 2-3 days.     If you hav small

## 2021-10-21 NOTE — OB NEONATOLOGY/PEDIATRICIAN DELIVERY SUMMARY - NSPEDSNEONOTESA_OBGYN_ALL_OB_FT
40.2 wk female born via  to a 19 y/o  mother. Maternal history of anemia on iron supplementation. Prenatal history of IOL for poor fetal growth. Blood type O+, HIV[-], RPR [NR], rubella [I] , Hep B[-], GBS [unk] not tx. SROM at 11:42 with clear fluids. Baby emerged vigorous, crying, was w/d/s/s. APGARS 8/9 . Mom plans to initiate breastfeeding with formula supplementation, consents to Hep B vaccine. EOS 0.14. COVID negative 10/19. BW 3090 AGA.

## 2021-10-21 NOTE — OB NEONATOLOGY/PEDIATRICIAN DELIVERY SUMMARY - BABY A: VOID IN DELIVERY
"Occupational Therapy   Treatment    Name: Riaz Lane  MRN: [id number]  Admitting Diagnosis:  Embolic stroke involving left middle cerebral artery  11 Days Post-Op    Recommendations:     Discharge Recommendations: outpatient OT  Discharge Equipment Recommendations:  none  Barriers to discharge:  Inaccessible home environment(patient is homeless)    Assessment:     Riaz Lane is a 60 y.o. male with a medical diagnosis of Embolic stroke involving left middle cerebral artery.  He presents with performance deficits affecting function are weakness, impaired endurance, impaired functional mobilty, impaired cognition, impaired cardiopulmonary response to activity.     Rehab Prognosis:  Good; patient would benefit from acute skilled OT services to address these deficits and reach maximum level of function.       Plan:     Patient to be seen 3 x/week to address the above listed problems via self-care/home management, therapeutic activities, therapeutic exercises.   · Plan of Care Expires: 01/06/20  · Plan of Care Reviewed with: patient    Subjective     Patient: "gosh you guys have been the best"  "I feel like my talking is so much better"  "I just decided when its time to go, I can feel it and then I know I have to, that's why I left [place]"   "I forget easily"     Pain/Comfort:  · Pain Rating 1: 0/10  · Pain Rating Post-Intervention 1: 0/10    Objective:     Communicated with: RN prior to session.  Patient found HOB elevated with telemetry, peripheral IV upon OT entry to room. No family at bedside. Patient's room full of papers, pictures, memorabilia, per patient his friend had dropped them off.    General Precautions: Standard, aphasia   Orthopedic Precautions:N/A   Braces: N/A     Occupational Performance:     Instrumental Activities of Daily Living:  · Household management: patient given blank calender, with OT assistance, filled out important dates and names during patient[place] [facility] Patient "I can't believe I've " "been here so long"   · Medical management: education on use of written lists for consolidation of important names and phone numbers 2* patient's impaired STM and expressive aphasia. Patient succesfully created list with Mod VC for initiation and directions.   · Planning: patient encouraged to ask for names/phone numbers of medical team to maximize carry-through and access to continued medical support post discharge       [facility] 6 Click ADL: 24    Treatment & Education:  -Pt education on OT role and POC   -Importance of OOB activity with staff assistance  -Discharge planning within OT scope of practice  -White board updated  -All questions and concerns answered within OT scope of practice.       Patient left with bed in chair position with all lines intact, call button in reach, bed alarm on and RN notifiedEducation:  , MD present    GOALS:   Multidisciplinary Problems     Occupational Therapy Goals        Problem: Occupational Therapy Goal    Goal Priority Disciplines Outcome Interventions   Occupational Therapy Goal     OT, PT/OT Ongoing, Progressing    Description:  Goals set on 12/10, with expiration date 12/21:  Patient will increase functional independence with ADLs by performing:    Feeding with Stand-by Assistance. Met  Grooming while standing at sink with SBAssistance  UB Dressing with SBAssistance   LB Dressing with SBAssistance  Toileting from toilet with SBAssistance for hygiene and clothing management  Rolling to Bilateral with Stand-by Assistance. Met  Supine <> Sit with Stand-by Assistance.Met  Step transfer with SBAssistance  Toilet transfer to toilet with SBAssistance                      Time Tracking:     OT Date of Treatment: 12/17/19  OT Start Time: 1057  OT Stop Time: 1140  OT Total Time (min): 43 min    Billable Minutes:Self Care/Home Management 43    Kimberly Bermudez, OT  12/17/2019    " no

## 2021-10-21 NOTE — OB NEONATOLOGY/PEDIATRICIAN DELIVERY SUMMARY - NSMECDELIV[facility]ABYA_OBGYN_ALL_OB
Pt is a 24 y o  [id number]  Pregnancy is complicated by late prenatal 
no

## 2021-10-21 NOTE — OB NEONATOLOGY/PEDIATRICIAN DELIVERY SUMMARY - BABY A: APGAR 1 MIN SCORE, DELIVERY
Detail Level: Simple Medical Necessity Clause: Botulinum toxin hyperhidrosis therapy is medically necessary because Medical Necessity Information: LCD Guidelines vary from payer to payer. Please check with your payer's policy to determine medical necessity. Detail Level: Generalized Azithromycin Pregnancy And Lactation Text: This medication is considered safe during pregnancy and is also secreted in breast milk. Doxycycline Pregnancy And Lactation Text: This medication is Pregnancy Category D and not consider safe during pregnancy. It is also excreted in breast milk but is considered safe for shorter treatment courses. Birth Control Pills Pregnancy And Lactation Text: This medication should be avoided if pregnant and for the first 30 days post-partum. High Dose Vitamin A Counseling: Side effects reviewed, pt to contact office should one occur. Spironolactone Counseling: Patient advised regarding risks of diarrhea, abdominal pain, hyperkalemia, birth defects (for female patients), liver toxicity and renal toxicity. The patient may need blood work to monitor liver and kidney function and potassium levels while on therapy. The patient verbalized understanding of the proper use and possible adverse effects of spironolactone.  All of the patient's questions and concerns were addressed. Minocycline Counseling: Patient advised regarding possible photosensitivity and discoloration of the teeth, skin, lips, tongue and gums.  Patient instructed to avoid sunlight, if possible.  When exposed to sunlight, patients should wear protective clothing, sunglasses, and sunscreen.  The patient was instructed to call the office immediately if the following severe adverse effects occur:  hearing changes, easy bruising/bleeding, severe headache, or vision changes.  The patient verbalized understanding of the proper use and possible adverse effects of minocycline.  All of the patient's questions and concerns were addressed. Topical Sulfur Applications Counseling: Topical Sulfur Counseling: Patient counseled that this medication may cause skin irritation or allergic reactions.  In the event of skin irritation, the patient was advised to reduce the amount of the drug applied or use it less frequently.   The patient verbalized understanding of the proper use and possible adverse effects of topical sulfur application.  All of the patient's questions and concerns were addressed. Topical Retinoid Pregnancy And Lactation Text: This medication is Pregnancy Category C. It is unknown if this medication is excreted in breast milk. Minocycline Pregnancy And Lactation Text: This medication is Pregnancy Category D and not consider safe during pregnancy. It is also excreted in breast milk. Isotretinoin Counseling: Patient should get monthly blood tests, not donate blood, not drive at night if vision affected, not share medication, and not undergo elective surgery for 6 months after tx completed. Side effects reviewed, pt to contact office should one occur. Tetracycline Counseling: Patient counseled regarding possible photosensitivity and increased risk for sunburn.  Patient instructed to avoid sunlight, if possible.  When exposed to sunlight, patients should wear protective clothing, sunglasses, and sunscreen.  The patient was instructed to call the office immediately if the following severe adverse effects occur:  hearing changes, easy bruising/bleeding, severe headache, or vision changes.  The patient verbalized understanding of the proper use and possible adverse effects of tetracycline.  All of the patient's questions and concerns were addressed. Patient understands to avoid pregnancy while on therapy due to potential birth defects. Benzoyl Peroxide Pregnancy And Lactation Text: This medication is Pregnancy Category C. It is unknown if benzoyl peroxide is excreted in breast milk. Topical Clindamycin Counseling: Patient counseled that this medication may cause skin irritation or allergic reactions.  In the event of skin irritation, the patient was advised to reduce the amount of the drug applied or use it less frequently.   The patient verbalized understanding of the proper use and possible adverse effects of clindamycin.  All of the patient's questions and concerns were addressed. 8 Azithromycin Counseling:  I discussed with the patient the risks of azithromycin including but not limited to GI upset, allergic reaction, drug rash, diarrhea, and yeast infections. Spironolactone Pregnancy And Lactation Text: This medication can cause feminization of the male fetus and should be avoided during pregnancy. The active metabolite is also found in breast milk. Dapsone Pregnancy And Lactation Text: This medication is Pregnancy Category C and is not considered safe during pregnancy or breast feeding. Topical Sulfur Applications Pregnancy And Lactation Text: This medication is Pregnancy Category C and has an unknown safety profile during pregnancy. It is unknown if this topical medication is excreted in breast milk. Doxycycline Counseling:  Patient counseled regarding possible photosensitivity and increased risk for sunburn.  Patient instructed to avoid sunlight, if possible.  When exposed to sunlight, patients should wear protective clothing, sunglasses, and sunscreen.  The patient was instructed to call the office immediately if the following severe adverse effects occur:  hearing changes, easy bruising/bleeding, severe headache, or vision changes.  The patient verbalized understanding of the proper use and possible adverse effects of doxycycline.  All of the patient's questions and concerns were addressed. Tazorac Counseling:  Patient advised that medication is irritating and drying.  Patient may need to apply sparingly and wash off after an hour before eventually leaving it on overnight.  The patient verbalized understanding of the proper use and possible adverse effects of tazorac.  All of the patient's questions and concerns were addressed. Use Enhanced Medication Counseling?: No Birth Control Pills Counseling: Birth Control Pill Counseling: I discussed with the patient the potential side effects of OCPs including but not limited to increased risk of stroke, heart attack, thrombophlebitis, deep venous thrombosis, hepatic adenomas, breast changes, GI upset, headaches, and depression.  The patient verbalized understanding of the proper use and possible adverse effects of OCPs. All of the patient's questions and concerns were addressed. Topical Retinoid counseling:  Patient advised to apply a pea-sized amount only at bedtime and wait 30 minutes after washing their face before applying.  If too drying, patient may add a non-comedogenic moisturizer. The patient verbalized understanding of the proper use and possible adverse effects of retinoids.  All of the patient's questions and concerns were addressed. Bactrim Counseling:  I discussed with the patient the risks of sulfa antibiotics including but not limited to GI upset, allergic reaction, drug rash, diarrhea, dizziness, photosensitivity, and yeast infections.  Rarely, more serious reactions can occur including but not limited to aplastic anemia, agranulocytosis, methemoglobinemia, blood dyscrasias, liver or kidney failure, lung infiltrates or desquamative/blistering drug rashes. Topical Clindamycin Pregnancy And Lactation Text: This medication is Pregnancy Category B and is considered safe during pregnancy. It is unknown if it is excreted in breast milk. Erythromycin Counseling:  I discussed with the patient the risks of erythromycin including but not limited to GI upset, allergic reaction, drug rash, diarrhea, increase in liver enzymes, and yeast infections. Bactrim Pregnancy And Lactation Text: This medication is Pregnancy Category D and is known to cause fetal risk.  It is also excreted in breast milk. Benzoyl Peroxide Counseling: Patient counseled that medicine may cause skin irritation and bleach clothing.  In the event of skin irritation, the patient was advised to reduce the amount of the drug applied or use it less frequently.   The patient verbalized understanding of the proper use and possible adverse effects of benzoyl peroxide.  All of the patient's questions and concerns were addressed. High Dose Vitamin A Pregnancy And Lactation Text: High dose vitamin A therapy is contraindicated during pregnancy and breast feeding. Isotretinoin Pregnancy And Lactation Text: This medication is Pregnancy Category X and is considered extremely dangerous during pregnancy. It is unknown if it is excreted in breast milk. Dapsone Counseling: I discussed with the patient the risks of dapsone including but not limited to hemolytic anemia, agranulocytosis, rashes, methemoglobinemia, kidney failure, peripheral neuropathy, headaches, GI upset, and liver toxicity.  Patients who start dapsone require monitoring including baseline LFTs and weekly CBCs for the first month, then every month thereafter.  The patient verbalized understanding of the proper use and possible adverse effects of dapsone.  All of the patient's questions and concerns were addressed. Tazorac Pregnancy And Lactation Text: This medication is not safe during pregnancy. It is unknown if this medication is excreted in breast milk. Erythromycin Pregnancy And Lactation Text: This medication is Pregnancy Category B and is considered safe during pregnancy. It is also excreted in breast milk. 16

## 2021-10-21 NOTE — PROGRESS NOTE ADULT - SUBJECTIVE AND OBJECTIVE BOX
OB Progress Note:  PPD#1    S: 19yo  now PPD#1 s/p   c/b preeclampsia with severe features on Mag(10/20-). Patient feels well. Pain is well controlled. She is tolerating a regular diet and passing flatus. She is voiding spontaneously, and ambulating without difficulty. Denies CP/SOB. Denies lightheadedness/dizziness. Denies N/V.    O:  Vitals:  Vital Signs Last 24 Hrs  T(C): 36.8 (21 Oct 2021 04:50), Max: 37.3 (20 Oct 2021 23:30)  T(F): 98.2 (21 Oct 2021 04:50), Max: 99.1 (20 Oct 2021 23:30)  HR: 64 (21 Oct 2021 08:00) (53 - 141)  BP: 116/83 (21 Oct 2021 08:00) (97/65 - 154/101)  BP(mean): --  RR: 18 (21 Oct 2021 08:00) (16 - 18)  SpO2: 98% (21 Oct 2021 08:00) (92% - 100%)    MEDICATIONS  (STANDING):  acetaminophen   Tablet .. 975 milliGRAM(s) Oral <User Schedule>  diphtheria/tetanus/pertussis (acellular) Vaccine (ADAcel) 0.5 milliLiter(s) IntraMuscular once  ibuprofen  Tablet. 600 milliGRAM(s) Oral every 6 hours  lactated ringers. 1000 milliLiter(s) (50 mL/Hr) IV Continuous <Continuous>  magnesium sulfate Infusion 2 Gm/Hr (50 mL/Hr) IV Continuous <Continuous>  metoclopramide Injectable 10 milliGRAM(s) IV Push once  oxytocin Infusion 333.333 milliUNIT(s)/Min (1000 mL/Hr) IV Continuous <Continuous>  oxytocin Infusion 333.333 milliUNIT(s)/Min (1000 mL/Hr) IV Continuous <Continuous>  oxytocin Infusion. 2 milliUNIT(s)/Min (2 mL/Hr) IV Continuous <Continuous>  prenatal multivitamin 1 Tablet(s) Oral daily  sodium chloride 0.9% lock flush 3 milliLiter(s) IV Push every 8 hours      Labs:  Blood type: O Positive  Rubella IgG: RPR: Negative                          11.6   17.62<H> >-----------< 142<L>    ( 10-21 @ 07:27 )             33.0<L>                        12.5   10.32 >-----------< 135<L>    ( 10-20 @ 22:31 )             36.1                        12.7   6.71 >-----------< 189    ( 10-19 @ 21:35 )             36.7    10-21-21 @ 07:27      138  |  108<H>  |  7   ----------------------------<  75  4.1   |  20<L>  |  0.76    10-20-21 @ 22:31      135  |  104  |  8   ----------------------------<  69<L>  3.8   |  19<L>  |  0.72        Ca    8.0<L>      21 Oct 2021 07:27  Ca    8.3<L>      20 Oct 2021 22:31    TPro  4.9<L>  /  Alb  2.8<L>  /  TBili  0.4  /  DBili  x   /  AST  26  /  ALT  7   /  AlkPhos  70  10-21-21 @ 07:27  TPro  5.6<L>  /  Alb  3.1<L>  /  TBili  0.4  /  DBili  x   /  AST  20  /  ALT  9   /  AlkPhos  127<H>  10-20-21 @ 22:31          Physical Exam:  General: NAD  Abdomen: soft, non-tender, non-distended, fundus firm  Vaginal: Lochia wnl  Extremities: No erythema/edema    A/P: 19yo PPD#1 s/p .   Patient is doing well postpartum.   - Pain well controlled, continue current pain regimen  - Increase ambulation, SCDs when not ambulating  - Continue regular diet  - Standing colace     OB Progress Note:  PPD#1    S: 19yo  now PPD#1 s/p   c/b preeclampsia with severe features on Mag(10/20-). Patient feels well. Pain is well controlled. She is tolerating a regular diet and passing flatus. She is voiding spontaneously, and ambulating without difficulty. Denies CP/SOB. Denies lightheadedness/dizziness. Denies N/V. Patient denies HA, changes or spots in vision, new swelling in the hands and face, or RUQ/epigastric pain.     O:  Vitals:  Vital Signs Last 24 Hrs  T(C): 36.8 (21 Oct 2021 04:50), Max: 37.3 (20 Oct 2021 23:30)  T(F): 98.2 (21 Oct 2021 04:50), Max: 99.1 (20 Oct 2021 23:30)  HR: 64 (21 Oct 2021 08:00) (53 - 141)  BP: 116/83 (21 Oct 2021 08:00) (97/65 - 154/101)  BP(mean): --  RR: 18 (21 Oct 2021 08:00) (16 - 18)  SpO2: 98% (21 Oct 2021 08:00) (92% - 100%)    MEDICATIONS  (STANDING):  acetaminophen   Tablet .. 975 milliGRAM(s) Oral <User Schedule>  diphtheria/tetanus/pertussis (acellular) Vaccine (ADAcel) 0.5 milliLiter(s) IntraMuscular once  ibuprofen  Tablet. 600 milliGRAM(s) Oral every 6 hours  lactated ringers. 1000 milliLiter(s) (50 mL/Hr) IV Continuous <Continuous>  magnesium sulfate Infusion 2 Gm/Hr (50 mL/Hr) IV Continuous <Continuous>  metoclopramide Injectable 10 milliGRAM(s) IV Push once  oxytocin Infusion 333.333 milliUNIT(s)/Min (1000 mL/Hr) IV Continuous <Continuous>  oxytocin Infusion 333.333 milliUNIT(s)/Min (1000 mL/Hr) IV Continuous <Continuous>  oxytocin Infusion. 2 milliUNIT(s)/Min (2 mL/Hr) IV Continuous <Continuous>  prenatal multivitamin 1 Tablet(s) Oral daily  sodium chloride 0.9% lock flush 3 milliLiter(s) IV Push every 8 hours      Labs:  Blood type: O Positive  Rubella IgG: RPR: Negative                          11.6   17.62<H> >-----------< 142<L>    ( 10-21 @ 07:27 )             33.0<L>                        12.5   10.32 >-----------< 135<L>    ( 10-20 @ 22:31 )             36.1                        12.7   6.71 >-----------< 189    ( 10-19 @ 21:35 )             36.7    10-21-21 @ 07:27      138  |  108<H>  |  7   ----------------------------<  75  4.1   |  20<L>  |  0.76    10-20-21 @ 22:31      135  |  104  |  8   ----------------------------<  69<L>  3.8   |  19<L>  |  0.72        Ca    8.0<L>      21 Oct 2021 07:27  Ca    8.3<L>      20 Oct 2021 22:31    TPro  4.9<L>  /  Alb  2.8<L>  /  TBili  0.4  /  DBili  x   /  AST  26  /  ALT  7   /  AlkPhos  70  10-21-21 @ 07:27  TPro  5.6<L>  /  Alb  3.1<L>  /  TBili  0.4  /  DBili  x   /  AST  20  /  ALT  9   /  AlkPhos  127<H>  10-20-21 @ 22:31          Physical Exam:  General: NAD  Abdomen: soft, non-tender, non-distended, fundus firm  Vaginal: Lochia wnl  Extremities: No erythema/edema

## 2021-10-21 NOTE — PROGRESS NOTE ADULT - SUBJECTIVE AND OBJECTIVE BOX
· Subjective and Objective:   OB Progress Note:  PPD#1    S: 21yo  now PPD#1 s/p   c/b preeclampsia with severe features on Mag(10/20-). Patient feels well. Pain is well controlled. She is tolerating a regular diet and passing flatus. She is voiding spontaneously, and ambulating without difficulty. Denies CP/SOB. Denies lightheadedness/dizziness. Denies N/V. Tolerating magnesium Well Denies Headache, blurry vision or epigastric pain    Vital Signs Last 24 Hrs  T(C): 36.8 (21 Oct 2021 04:50), Max: 37.3 (20 Oct 2021 23:30)  T(F): 98.2 (21 Oct 2021 04:50), Max: 99.1 (20 Oct 2021 23:30)  HR: 104 (21 Oct 2021 10:00) (53 - 141)  BP: 118/84 (21 Oct 2021 10:00) (97/65 - 154/101)  BP(mean): --  RR: 16 (21 Oct 2021 10:00) (16 - 18)  SpO2: 98% (21 Oct 2021 10:00) (92% - 100%)    Physical Exam:     Gen: A&Ox 3, NAD  Chest: CTA B/L  Cardiac: S1,S2; RRR  Breast: Soft, nontender, nonengorged  Abdomen: +BS, Soft, nontender, ND; Fundus firm below umbilicus  Gyn: mod lochia, intact/repaired  Ext: Nontender, DTRS 2+, no worsening edema                          11.6   17.62 )-----------( 142      ( 21 Oct 2021 07:27 )             33.0       A/P:  PPD#1 s/p   -Continue pain management  -Encourage OOB and ambulation  -Reviewed  CBC  -Continue current care  -Begin discharge Planning  -d/w dr Teddy Soni, [facility]  Office #: [phone]     · Subjective and Objective:   OB Progress Note:  PPD#1    S: 19yo  now PPD#1 s/p   c/b preeclampsia with severe features on Mag(10/20-). Patient feels well. Pain is well controlled. She is tolerating a regular diet and passing flatus. She is voiding spontaneously, and ambulating without difficulty. Denies CP/SOB. Denies lightheadedness/dizziness. Denies N/V. Tolerating magnesium Well Denies Headache, blurry vision or epigastric pain.     Vital Signs Last 24 Hrs  T(C): 36.8 (21 Oct 2021 04:50), Max: 37.3 (20 Oct 2021 23:30)  T(F): 98.2 (21 Oct 2021 04:50), Max: 99.1 (20 Oct 2021 23:30)  HR: 104 (21 Oct 2021 10:00) (53 - 141)  BP: 118/84 (21 Oct 2021 10:00) (97/65 - 154/101)  BP(mean): --  RR: 16 (21 Oct 2021 10:00) (16 - 18)  SpO2: 98% (21 Oct 2021 10:00) (92% - 100%)    Physical Exam:     Gen: A&Ox 3, NAD  Chest: CTA B/L  Cardiac: S1,S2; RRR  Breast: Soft, nontender, nonengorged  Abdomen: +BS, Soft, nontender, ND; Fundus firm below umbilicus  Gyn: mod lochia, intact/repaired  Ext: Nontender, DTRS 2+, no worsening edema                          11.6   17.62 )-----------( 142      ( 21 Oct 2021 07:27 )             33.0       A/P:  PPD#1 s/p   -Continue pain management  -Encourage OOB and ambulation  -Reviewed  CBC  -Continue current care  -Begin discharge Planning  -d/w dr Teddy Soni, [facility]  Office #: [phone]     · Subjective and Objective:   OB Progress Note:  PPD#1    S: 21yo  now PPD#1 s/p   c/b preeclampsia with severe features on Mag(10/20-). Patient feels well. Pain is well controlled. She is tolerating a regular diet and passing flatus. She is voiding spontaneously, and ambulating without difficulty. Denies CP/SOB. Denies lightheadedness/dizziness. Denies N/V. Tolerating magnesium Well Denies Headache, blurry vision or epigastric pain.   Pt tolerating Magnesium well    Vital Signs Last 24 Hrs  T(C): 36.8 (21 Oct 2021 04:50), Max: 37.3 (20 Oct 2021 23:30)  T(F): 98.2 (21 Oct 2021 04:50), Max: 99.1 (20 Oct 2021 23:30)  HR: 104 (21 Oct 2021 10:00) (53 - 141)  BP: 118/84 (21 Oct 2021 10:00) (97/65 - 154/101)  BP(mean): --  RR: 16 (21 Oct 2021 10:00) (16 - 18)  SpO2: 98% (21 Oct 2021 10:00) (92% - 100%)    Physical Exam:     Gen: A&Ox 3, NAD  Chest: CTA B/L  Cardiac: S1,S2; RRR  Breast: Soft, nontender, nonengorged  Abdomen: +BS, Soft, nontender, ND; Fundus firm below umbilicus  Gyn: mod lochia, intact/repaired  Ext: Nontender, DTRS 2+, no worsening edema                          11.6   17.62 )-----------( 142      ( 21 Oct 2021 07:27 )             33.0       A/P:  PPD#1 s/p   - Continue pain management  - Encourage OOB and ambulation with assistance to bathroom  - Reviewed  CBC  - cont q 6hrs magnesium Levels  - Strict I&O  -Continue current care  -Begin discharge Planning. Pt has been instructed regarding at home BP mgmt, PT will have short interval follow up in 3 days after discharge, Pt will call office if BP >140/90. PT to bring BP log to office for follow up appt  - Breastfeeding encouraged    -d/w dr Teddy Soni, [facility]  Office #: [phone]

## 2021-10-21 NOTE — LACTATION INITIAL EVALUATION - DELIVERY MODE
Encouraged Breast pump .  Reviewed collection of breast milk storage guidelines, along with proper cleaning instructions of pump  Stressed importance of pumping to relieve engorement, and wearing a supportive bra. Encouraged increased breastfeeding/pumping . Pumping guidelines reinforced. Verbalized understanding to call for assistance prn.

## 2021-10-22 DIAGNOSIS — O14.93 UNSPECIFIED PRE-ECLAMPSIA, THIRD TRIMESTER: ICD-10-CM

## 2021-10-22 LAB — MAGNESIUM SERPL-MCNC: 7.1 MG/DL — CRITICAL HIGH (ref 1.6–2.6)

## 2021-10-22 RX ORDER — INFLUENZA VIRUS VACCINE 15; 15; 15; 15 UG/.5ML; UG/.5ML; UG/.5ML; UG/.5ML
0.5 SUSPENSION INTRAMUSCULAR ONCE
Refills: 0 | Status: DISCONTINUED | OUTPATIENT
Start: 2021-10-22 | End: 2021-10-23

## 2021-10-22 RX ADMIN — Medication 975 MILLIGRAM(S): at 03:55

## 2021-10-22 RX ADMIN — OXYCODONE HYDROCHLORIDE 5 MILLIGRAM(S): 5 TABLET ORAL at 11:50

## 2021-10-22 RX ADMIN — Medication 975 MILLIGRAM(S): at 16:11

## 2021-10-22 RX ADMIN — OXYCODONE HYDROCHLORIDE 5 MILLIGRAM(S): 5 TABLET ORAL at 12:59

## 2021-10-22 RX ADMIN — Medication 1 TABLET(S): at 11:50

## 2021-10-22 RX ADMIN — Medication 975 MILLIGRAM(S): at 09:46

## 2021-10-22 RX ADMIN — Medication 975 MILLIGRAM(S): at 22:01

## 2021-10-22 RX ADMIN — Medication 975 MILLIGRAM(S): at 03:04

## 2021-10-22 RX ADMIN — Medication 975 MILLIGRAM(S): at 23:00

## 2021-10-22 RX ADMIN — SODIUM CHLORIDE 3 MILLILITER(S): 9 INJECTION INTRAMUSCULAR; INTRAVENOUS; SUBCUTANEOUS at 13:07

## 2021-10-22 RX ADMIN — Medication 975 MILLIGRAM(S): at 08:32

## 2021-10-22 RX ADMIN — SODIUM CHLORIDE 3 MILLILITER(S): 9 INJECTION INTRAMUSCULAR; INTRAVENOUS; SUBCUTANEOUS at 07:02

## 2021-10-22 RX ADMIN — Medication 975 MILLIGRAM(S): at 17:21

## 2021-10-22 RX ADMIN — SODIUM CHLORIDE 3 MILLILITER(S): 9 INJECTION INTRAMUSCULAR; INTRAVENOUS; SUBCUTANEOUS at 00:49

## 2021-10-22 NOTE — PROGRESS NOTE ADULT - ASSESSMENT
A/P: 21yo PPD#2 s/p  c/b sPEC on Mg.  Patient is stable and doing well post-partum.      #sPEC  - BP normotensive on Mg without additional medications  - Continue to monitor BPs  - Currently no signs or symptoms of PEC. Signs and symptoms reviewed with pt  - UOP adequate     #PP Care  - Pain well controlled, continue current pain regimen  - Increase ambulation, SCDs when not ambulating  - Continue regular diet  - SW to see pt given age     Alannah Mcmullen, PGY1
A/P: 21yo  now PPD#1 s/p   c/b preeclampsia with severe features on Mag(10/20-).  Patient is doing well postpartum.     #sPEC/Mg   -Patient's BPs are 110s-120s/70s-80s since starting magnesium.   -Patient is not on antihypertensives.   -Patient denies RUQ pain this morning that she previously endorsed.  -AM HELLP labs show a large MCV of 100.3 consistent with HELLP labs on admission. P/C ratio not sent. All other HELLP labs are normal.    #PP care  - Pain well controlled, continue current pain regimen  - Increase ambulation, SCDs when not ambulating  - Continue regular diet    Jayna Min, PGY1   
Assessment and Plan  PPD #2 s/p   Doing well and bonding with baby.  Encourage ambulation.  PP & PPD Instructions reviewed.  PP PEC  ·	s/p mag sulfate  ·	VSS  ·	pt to continue checking BP's at home TID, keep logs and bring to each visit (pt has BP cuff at home)  ·	parameters reviewed  ·	PEC precautions reviewed  CPC.  D/C home today  RTO within 1 week for BP check/PRN

## 2021-10-22 NOTE — CHART NOTE - NSCHARTNOTEFT_GEN_A_CORE
19yo PPD#2 s/p  initially planning discharge today, however, due to elevated blood pressures of 129/94, pt will be kept until tomorrow 10/23 for BP monitoring prior to discharge,    Amyeo Afroz Jereen, PGY-1    d/w Dr. Mcadams
Patient is s/p  (EBL 500cc) c/b gHTN complaining of RUQ pain. Patient evaluated by PGY1, noted to have "pinching" sensation in RUQ. Pain is described at 10/10, however patient resting comfortably in bed. States she had some pain during pregnancy, but has not had this discomfort before.   Bedside FAST scan performed: Retroverted uterus, thin EML, no free fluid noted in pelvis, perihepatic and perisplenic spaces.    Patient states she is feeling better and will attempt to ambulate to bathroom to void.   Patient cleared for d/c to postpartum floor.     RFrankel PGY3
Patient seen at bedside   Reports lots of pain   Requesting epidural   Tracing category 1   [place]: irregular   VE: 1/40 /-3   Epidural requested- anesthesia made aware   Cytotec once contractions space out
Patient transferred to OR2 for possible c/s due to Cat II tracing with variables + prolonged deceleration to 50s-60s x 5 minutes after SROM and removal of balloon, Terbutaline given x 1   When in OR 2, IUPC and FSE reconnected - FHT monitored for ~ 10 minutes, Cat I with moderate variability and early decelerations  Explained rationale to patient to continue with expectant management as she is still rachel regularly on her own as of now and has made change to 6/60/-3  Will continue to monitor tracing closely  Informed consent obtained for PCS after thorough discussion of r/b/a, all patient questions answered.  Patient transferred back to [facility], [facility] interpretation of plan by PGY4 Dr. Narvaez, all questions answered.   Low threshold for PCS if persistent NRFHT, begin amnioinfusion.
R4  Patient examined for increased pressure  VE: 10/00/+1  EFM: 140, mod, +accels, -decels  [place]: q1-2min  - anticipate   - c/w pitshayan Becker PGY4  dw Dr. Mcbride
patient seen and evaluated at bedside   FHT cat I, [place] q2 minutes  MVU inadequate  VE: 7/80/-2  begin pitocin augmentation, titrate slowly 2/2 prior cat II tracing earlier  explained plan in detail to patient at bedside  comfortable with epidural  all questions answered

## 2021-10-22 NOTE — PROGRESS NOTE ADULT - SUBJECTIVE AND OBJECTIVE BOX
OB Progress Note:  PPD#2    S: 19yo PPD#2 s/p  complicated by sPEC on Mg. Patient feels well. Pain is well controlled, tolerating regular diet, passing flatus, voiding spontaneously, ambulating without difficulty. Denies heavy vaginal bleeding, CP/SOB, leadheadedness/dizziness, N/V.    O:  Vitals:   Vital Signs Last 24 Hrs  T(C): 36.8 (22 Oct 2021 06:51), Max: 36.8 (22 Oct 2021 06:51)  T(F): 98.3 (22 Oct 2021 06:51), Max: 98.3 (22 Oct 2021 06:51)  HR: 61 (22 Oct 2021 06:51) (61 - 104)  BP: 119/79 (22 Oct 2021 06:51) (116/83 - 129/88)  BP(mean): --  RR: 19 (22 Oct 2021 06:51) (16 - 19)  SpO2: 98% (22 Oct 2021 06:51) (98% - 100%)    MEDICATIONS  (STANDING):  acetaminophen   Tablet .. 975 milliGRAM(s) Oral <User Schedule>  diphtheria/tetanus/pertussis (acellular) Vaccine (ADAcel) 0.5 milliLiter(s) IntraMuscular once  ibuprofen  Tablet. 600 milliGRAM(s) Oral every 6 hours  lactated ringers. 1000 milliLiter(s) (50 mL/Hr) IV Continuous <Continuous>  magnesium sulfate Infusion 2 Gm/Hr (50 mL/Hr) IV Continuous <Continuous>  metoclopramide Injectable 10 milliGRAM(s) IV Push once  prenatal multivitamin 1 Tablet(s) Oral daily  sodium chloride 0.9% lock flush 3 milliLiter(s) IV Push every 8 hours    MEDICATIONS  (PRN):  benzocaine 20%/menthol 0.5% Spray 1 Spray(s) Topical every 6 hours PRN for Perineal discomfort  dibucaine 1% Ointment 1 Application(s) Topical every 6 hours PRN Perineal discomfort  diphenhydrAMINE 25 milliGRAM(s) Oral every 6 hours PRN Pruritus  hydrocortisone 1% Cream 1 Application(s) Topical every 6 hours PRN Moderate Pain (4-6)  lanolin Ointment 1 Application(s) Topical every 6 hours PRN nipple soreness  magnesium hydroxide Suspension 30 milliLiter(s) Oral two times a day PRN Constipation  oxyCODONE    IR 5 milliGRAM(s) Oral every 3 hours PRN Moderate to Severe Pain (4-10)  oxyCODONE    IR 5 milliGRAM(s) Oral once PRN Moderate to Severe Pain (4-10)  pramoxine 1%/zinc 5% Cream 1 Application(s) Topical every 4 hours PRN Moderate Pain (4-6)  simethicone 80 milliGRAM(s) Chew every 4 hours PRN Gas  witch hazel Pads 1 Application(s) Topical every 4 hours PRN Perineal discomfort      Labs:  Blood type: O Positive  Rubella IgG: RPR: Negative                          11.6   17.62<H> >-----------< 142<L>    ( 10-21 @ 07:27 )             33.0<L>                        12.5   10.32 >-----------< 135<L>    ( 10-20 @ 22:31 )             36.1                        12.7   6.71 >-----------< 189    ( 10-19 @ 21:35 )             36.7    10-21-21 @ 07:27      138  |  108<H>  |  7   ----------------------------<  75  4.1   |  20<L>  |  0.76    10-20-21 @ 22:31      135  |  104  |  8   ----------------------------<  69<L>  3.8   |  19<L>  |  0.72        Ca    8.0<L>      21 Oct 2021 07:27  Ca    8.3<L>      20 Oct 2021 22:31  Mg     7.10<HH>     10-22  Mg     6.70<H>     10-21  Mg     6.70<H>     10-21    TPro  4.9<L>  /  Alb  2.8<L>  /  TBili  0.4  /  DBili  x   /  AST  26  /  ALT  7   /  AlkPhos  70  10-21-21 @ 07:27  TPro  5.6<L>  /  Alb  3.1<L>  /  TBili  0.4  /  DBili  x   /  AST  20  /  ALT  9   /  AlkPhos  127<H>  10-20-21 @ 22:31          Physical Exam:  General: NAD  Abdomen: soft, non-tender, non-distended, fundus firm  Vaginal: No heavy vaginal bleeding  Extremities: No erythema/edema

## 2021-10-22 NOTE — PROGRESS NOTE ADULT - SUBJECTIVE AND OBJECTIVE BOX
Post-partum Note,   She is a  20y woman who is now post-partum day: 2    Subjective:  The patient feels well.  She is ambulating.   She is tolerating regular diet.  She denies nausea and vomiting; denies fever.  She is voiding.  Her pain is controlled.  She reports normal postpartum bleeding.  She is breastfeeding and formula feeding.  Denies HA, changes in vision, epigastric pain, SOB    Physical exam:    Vital Signs Last 24 Hrs  T(C): 37.2 (22 Oct 2021 09:57), Max: 37.2 (22 Oct 2021 09:57)  T(F): 98.9 (22 Oct 2021 09:57), Max: 98.9 (22 Oct 2021 09:57)  HR: 89 (22 Oct 2021 09:57) (61 - 102)  BP: 124/88 (22 Oct 2021 09:57) (116/84 - 129/88)  BP(mean): --  RR: 18 (22 Oct 2021 09:57) (16 - 19)  SpO2: 100% (22 Oct 2021 09:57) (98% - 100%)    Gen: NAD  Breast: Soft, nontender, not engorged.  Abdomen: Soft, nontender, no distension , firm uterine fundus at umbilicus.  Pelvic: Normal lochia noted  Ext: No calf tenderness    LABS:                        11.6   17.62 )-----------( 142      ( 21 Oct 2021 07:27 )             33.0       Allergies    No Known Drug Allergies  shellfish (Vomiting)        MEDICATIONS  (STANDING):  acetaminophen   Tablet .. 975 milliGRAM(s) Oral <User Schedule>  diphtheria/tetanus/pertussis (acellular) Vaccine (ADAcel) 0.5 milliLiter(s) IntraMuscular once  ibuprofen  Tablet. 600 milliGRAM(s) Oral every 6 hours  lactated ringers. 1000 milliLiter(s) (50 mL/Hr) IV Continuous <Continuous>  magnesium sulfate Infusion 2 Gm/Hr (50 mL/Hr) IV Continuous <Continuous>  metoclopramide Injectable 10 milliGRAM(s) IV Push once  prenatal multivitamin 1 Tablet(s) Oral daily  sodium chloride 0.9% lock flush 3 milliLiter(s) IV Push every 8 hours    MEDICATIONS  (PRN):  benzocaine 20%/menthol 0.5% Spray 1 Spray(s) Topical every 6 hours PRN for Perineal discomfort  dibucaine 1% Ointment 1 Application(s) Topical every 6 hours PRN Perineal discomfort  diphenhydrAMINE 25 milliGRAM(s) Oral every 6 hours PRN Pruritus  hydrocortisone 1% Cream 1 Application(s) Topical every 6 hours PRN Moderate Pain (4-6)  lanolin Ointment 1 Application(s) Topical every 6 hours PRN nipple soreness  magnesium hydroxide Suspension 30 milliLiter(s) Oral two times a day PRN Constipation  oxyCODONE    IR 5 milliGRAM(s) Oral every 3 hours PRN Moderate to Severe Pain (4-10)  oxyCODONE    IR 5 milliGRAM(s) Oral once PRN Moderate to Severe Pain (4-10)  pramoxine 1%/zinc 5% Cream 1 Application(s) Topical every 4 hours PRN Moderate Pain (4-6)  simethicone 80 milliGRAM(s) Chew every 4 hours PRN Gas  witch hazel Pads 1 Application(s) Topical every 4 hours PRN Perineal discomfort

## 2021-10-23 VITALS
RESPIRATION RATE: 18 BRPM | HEART RATE: 79 BPM | TEMPERATURE: 98 F | DIASTOLIC BLOOD PRESSURE: 87 MMHG | OXYGEN SATURATION: 100 % | SYSTOLIC BLOOD PRESSURE: 126 MMHG

## 2021-10-23 RX ADMIN — Medication 975 MILLIGRAM(S): at 15:12

## 2021-10-23 RX ADMIN — Medication 975 MILLIGRAM(S): at 15:42

## 2021-10-23 RX ADMIN — SODIUM CHLORIDE 3 MILLILITER(S): 9 INJECTION INTRAMUSCULAR; INTRAVENOUS; SUBCUTANEOUS at 06:26

## 2021-10-23 RX ADMIN — SODIUM CHLORIDE 3 MILLILITER(S): 9 INJECTION INTRAMUSCULAR; INTRAVENOUS; SUBCUTANEOUS at 00:54

## 2021-10-23 RX ADMIN — SODIUM CHLORIDE 3 MILLILITER(S): 9 INJECTION INTRAMUSCULAR; INTRAVENOUS; SUBCUTANEOUS at 15:13

## 2021-10-23 NOTE — PROGRESS NOTE ADULT - SUBJECTIVE AND OBJECTIVE BOX
OB Progress Note:  PPD #2    S:  21yo PPD#3 s/p  complicated by sPEC on Mg. Patient feels well. Pain is well controlled, tolerating regular diet, passing flatus, voiding spontaneously, ambulating without difficulty. Denies heavy vaginal bleeding, CP/SOB, leadheadedness/dizziness, N/V.    O:  Vitals:  Vital Signs Last 24 Hrs  T(C): 36.9 (23 Oct 2021 06:12), Max: 37.3 (23 Oct 2021 01:18)  T(F): 98.4 (23 Oct 2021 06:12), Max: 99.1 (23 Oct 2021 01:18)  HR: 95 (23 Oct 2021 06:12) (66 - 95)  BP: 128/86 (23 Oct 2021 06:12) (124/88 - 136/84)  BP(mean): --  RR: 18 (23 Oct 2021 06:12) (17 - 18)  SpO2: 99% (23 Oct 2021 06:12) (98% - 100%)    MEDICATIONS  (STANDING):  acetaminophen   Tablet .. 975 milliGRAM(s) Oral <User Schedule>  diphtheria/tetanus/pertussis (acellular) Vaccine (ADAcel) 0.5 milliLiter(s) IntraMuscular once  ibuprofen  Tablet. 600 milliGRAM(s) Oral every 6 hours  influenza   Vaccine 0.5 milliLiter(s) IntraMuscular once  lactated ringers. 1000 milliLiter(s) (50 mL/Hr) IV Continuous <Continuous>  magnesium sulfate Infusion 2 Gm/Hr (50 mL/Hr) IV Continuous <Continuous>  metoclopramide Injectable 10 milliGRAM(s) IV Push once  prenatal multivitamin 1 Tablet(s) Oral daily  sodium chloride 0.9% lock flush 3 milliLiter(s) IV Push every 8 hours      Labs:  Blood type: O Positive  Rubella IgG: RPR: Negative                          11.6   17.62<H> >-----------< 142<L>    ( 10-21 @ 07:27 )             33.0<L>                        12.5   10.32 >-----------< 135<L>    ( 10-20 @ 22:31 )             36.1    10-21- @ 07:27      138  |  108<H>  |  7   ----------------------------<  75  4.1   |  20<L>  |  0.76    10-20-21 @ 22:31      135  |  104  |  8   ----------------------------<  69<L>  3.8   |  19<L>  |  0.72        Ca    8.0<L>      21 Oct 2021 07:27  Ca    8.3<L>      20 Oct 2021 22:31  Mg     7.10<HH>     10-22  Mg     6.70<H>     10-21  Mg     6.70<H>     10-21    TPro  4.9<L>  /  Alb  2.8<L>  /  TBili  0.4  /  DBili  x   /  AST  26  /  ALT  7   /  AlkPhos  70  10-21-21 @ 07:27  TPro  5.6<L>  /  Alb  3.1<L>  /  TBili  0.4  /  DBili  x   /  AST  20  /  ALT  9   /  AlkPhos  127<H>  10-20-21 @ 22:31          Physical Exam:  General: NAD  Abdomen: Soft, non-tender, non-distended, fundus firm  Vaginal: Lochia wnl  Extremities: No erythema/edema    · Assessment	    A/P: 21yo PPD#2 s/p  c/b sPEC on Mg.  Patient is stable and doing well post-partum.      #sPEC  - BP normotensive on Mg without additional medications  - Continue to monitor BPs  - Currently no signs or symptoms of PEC. Signs and symptoms reviewed with pt  - UOP adequate     #PP Care  - Pain well controlled, continue current pain regimen  - Increase ambulation, SCDs when not ambulating  - Continue regular diet  - Seen by social work.  - Discharge planning today.    Amyeo Afroz Jereen, PGY-1

## 2021-10-23 NOTE — PROGRESS NOTE ADULT - ATTENDING COMMENTS
pt is stable for discharge   f/u in office for bp check on monday
Pt seen and evaluated at bedside resting comfortably without complaints. Denies HA, blurry vision, RUQ/epigastric pain, new onset swelling. Denies nausea/vomiting, fever/chills, cp/sob, dizziness/lightheadedness. Pt is ambulating, tolerating PO intake and voiding spontaneously.    PE  Vital Signs Last 24 Hrs  T(C): 37.2 (22 Oct 2021 09:57), Max: 37.2 (22 Oct 2021 09:57)  T(F): 98.9 (22 Oct 2021 09:57), Max: 98.9 (22 Oct 2021 09:57)  HR: 89 (22 Oct 2021 09:57) (61 - 99)  BP: 124/88 (22 Oct 2021 09:57) (116/84 - 129/88)  BP(mean): --  RR: 18 (22 Oct 2021 09:57) (16 - 19)  SpO2: 100% (22 Oct 2021 09:57) (98% - 100%)    Gen - NAD   Resp - no increased WOB  Abd - soft, NTND, uterus firm and midline below umbilicus  Pelvic - lochia wnl  Ext - NTBL     A/P:   PPD #2 s/p  complicated by PPH status post methergine then by postpartum preeclampsia with severe features status post magnesium sulfate. Pt without symptoms of preeclampsia with severe features and with well controlled BPs x24h.   Doing well and bonding with baby.  Encourage ambulation.  PP & PPD Instructions reviewed.  PP PEC precautions reviewed   s/p mag sulfate  VSS  pt to continue checking BP's at home TID, keep logs and bring to each visit (pt has BP cuff at home)  parameters reviewed  PEC precautions reviewed  CPC.  D/C home today  RTO within 1 week for BP check/PRN

## 2021-10-23 NOTE — PROGRESS NOTE ADULT - SUBJECTIVE AND OBJECTIVE BOX
OB Progress Note:  PPD #2    S: 19yo PPD#2 s/p  complicated by sPEC s/p Mg. Patient feels well. Pain is well controlled. She is tolerating a regular diet and passing flatus. She is voiding spontaneously, and ambulating without difficulty. Denies CP/SOB. Denies lightheadedness/dizziness. Denies N/V. Endorses 1+ edema.    O:  Vitals:  Vital Signs Last 24 Hrs  T(C): 36.9 (23 Oct 2021 06:12), Max: 37.3 (23 Oct 2021 01:18)  T(F): 98.4 (23 Oct 2021 06:12), Max: 99.1 (23 Oct 2021 01:18)  HR: 95 (23 Oct 2021 06:12) (66 - 95)  BP: 128/86 (23 Oct 2021 06:12) (124/88 - 136/84)  BP(mean): --  RR: 18 (23 Oct 2021 06:12) (17 - 18)  SpO2: 99% (23 Oct 2021 06:12) (98% - 100%)    MEDICATIONS  (STANDING):  acetaminophen   Tablet .. 975 milliGRAM(s) Oral <User Schedule>  diphtheria/tetanus/pertussis (acellular) Vaccine (ADAcel) 0.5 milliLiter(s) IntraMuscular once  ibuprofen  Tablet. 600 milliGRAM(s) Oral every 6 hours  influenza   Vaccine 0.5 milliLiter(s) IntraMuscular once  lactated ringers. 1000 milliLiter(s) (50 mL/Hr) IV Continuous <Continuous>  magnesium sulfate Infusion 2 Gm/Hr (50 mL/Hr) IV Continuous <Continuous>  metoclopramide Injectable 10 milliGRAM(s) IV Push once  prenatal multivitamin 1 Tablet(s) Oral daily  sodium chloride 0.9% lock flush 3 milliLiter(s) IV Push every 8 hours      Labs:  Blood type: O Positive  Rubella IgG: RPR: Negative                          11.6   17.62<H> >-----------< 142<L>    ( 10-21 @ 07:27 )             33.0<L>                        12.5   10.32 >-----------< 135<L>    ( 10-20 @ 22:31 )             36.1    10-21-21 @ 07:27      138  |  108<H>  |  7   ----------------------------<  75  4.1   |  20<L>  |  0.76    10-20-21 @ 22:31      135  |  104  |  8   ----------------------------<  69<L>  3.8   |  19<L>  |  0.72        Ca    8.0<L>      21 Oct 2021 07:27  Ca    8.3<L>      20 Oct 2021 22:31  Mg     7.10<HH>     10-22  Mg     6.70<H>     10-21  Mg     6.70<H>     10-21    TPro  4.9<L>  /  Alb  2.8<L>  /  TBili  0.4  /  DBili  x   /  AST  26  /  ALT  7   /  AlkPhos  70  10-21-21 @ 07:27  TPro  5.6<L>  /  Alb  3.1<L>  /  TBili  0.4  /  DBili  x   /  AST  20  /  ALT  9   /  AlkPhos  127<H>  10-20-21 @ 22:31        Physical Exam:  General: NAD  Abdomen: Soft, non-tender, non-distended, fundus firm  Vaginal: Lochia wnl  Extremities: No erythema/ 1+ edema    #sPEC  - BP normotensive on Mg without additional medications  - Continue to monitor BPs  - Currently no signs or symptoms of PEC. Signs and symptoms reviewed with pt  - UOP adequate     #PP Care  - Pain well controlled, continue current pain regimen  - Increase ambulation, SCDs when not ambulating  - Continue regular diet  - SW to see pt given age       Amyeo Afroz Jereen, PGY-1

## 2021-10-25 ENCOUNTER — NON-APPOINTMENT (OUTPATIENT)
Age: 21
End: 2021-10-25

## 2021-10-26 ENCOUNTER — NON-APPOINTMENT (OUTPATIENT)
Age: 21
End: 2021-10-26

## 2021-10-28 ENCOUNTER — NON-APPOINTMENT (OUTPATIENT)
Age: 21
End: 2021-10-28

## 2023-09-13 NOTE — OB PROVIDER TRIAGE NOTE - NS_CHIEFCOMPLAINT_OBGYN_ALL_OB
Dilia from [facility] called requesting an HMO referral for patient to see Neurologist, Hazel Toledo, APRN. Patient has an [employer] HMO, but specialist is outside of [employer] and [employer] will not approve referrals for specialists outside of [employer]. Dilia understood.   
Other

## 2024-08-08 ENCOUNTER — NON-APPOINTMENT (OUTPATIENT)
Age: 24
End: 2024-08-08
